# Patient Record
Sex: MALE | Race: WHITE | NOT HISPANIC OR LATINO | Employment: OTHER | URBAN - METROPOLITAN AREA
[De-identification: names, ages, dates, MRNs, and addresses within clinical notes are randomized per-mention and may not be internally consistent; named-entity substitution may affect disease eponyms.]

---

## 2018-05-01 ENCOUNTER — TRANSCRIBE ORDERS (OUTPATIENT)
Dept: ADMINISTRATIVE | Facility: HOSPITAL | Age: 83
End: 2018-05-01

## 2018-05-01 DIAGNOSIS — M40.204 KYPHOSIS OF THORACIC REGION, UNSPECIFIED KYPHOSIS TYPE: ICD-10-CM

## 2018-05-01 DIAGNOSIS — M81.0 SENILE OSTEOPOROSIS: Primary | ICD-10-CM

## 2018-05-07 ENCOUNTER — HOSPITAL ENCOUNTER (OUTPATIENT)
Dept: MAMMOGRAPHY | Facility: CLINIC | Age: 83
Discharge: HOME | End: 2018-05-07
Payer: COMMERCIAL

## 2018-05-07 DIAGNOSIS — M40.204 KYPHOSIS OF THORACIC REGION, UNSPECIFIED KYPHOSIS TYPE: ICD-10-CM

## 2018-05-07 PROCEDURE — 77080 DXA BONE DENSITY AXIAL: CPT

## 2018-09-28 ENCOUNTER — HOSPITAL ENCOUNTER (OUTPATIENT)
Dept: RADIOLOGY | Facility: HOSPITAL | Age: 83
Discharge: HOME | End: 2018-09-28
Payer: COMMERCIAL

## 2018-09-28 ENCOUNTER — TRANSCRIBE ORDERS (OUTPATIENT)
Dept: ADMINISTRATIVE | Facility: HOSPITAL | Age: 83
End: 2018-09-28

## 2018-09-28 DIAGNOSIS — R97.20 ELEVATED PROSTATE SPECIFIC ANTIGEN (PSA): ICD-10-CM

## 2018-09-28 DIAGNOSIS — R97.20 ELEVATED PROSTATE SPECIFIC ANTIGEN (PSA): Primary | ICD-10-CM

## 2018-09-28 PROCEDURE — 72100 X-RAY EXAM L-S SPINE 2/3 VWS: CPT

## 2018-09-28 PROCEDURE — 72170 X-RAY EXAM OF PELVIS: CPT

## 2018-11-30 ENCOUNTER — TRANSCRIBE ORDERS (OUTPATIENT)
Dept: ADMINISTRATIVE | Facility: HOSPITAL | Age: 83
End: 2018-11-30

## 2018-11-30 DIAGNOSIS — R06.02 SHORTNESS OF BREATH: Primary | ICD-10-CM

## 2018-11-30 DIAGNOSIS — I27.20 PROGRESSIVE PULMONARY HYPERTENSION (HCC): ICD-10-CM

## 2019-01-02 ENCOUNTER — APPOINTMENT (EMERGENCY)
Dept: RADIOLOGY | Facility: HOSPITAL | Age: 84
DRG: 554 | End: 2019-01-02
Payer: COMMERCIAL

## 2019-01-02 ENCOUNTER — HOSPITAL ENCOUNTER (INPATIENT)
Facility: HOSPITAL | Age: 84
LOS: 5 days | Discharge: NURSING, REHAB OR LONG-TERM CARE | DRG: 554 | End: 2019-01-07
Attending: EMERGENCY MEDICINE | Admitting: INTERNAL MEDICINE
Payer: COMMERCIAL

## 2019-01-02 DIAGNOSIS — L03.115 CELLULITIS OF RIGHT FOOT: Primary | ICD-10-CM

## 2019-01-02 DIAGNOSIS — E87.6 HYPOKALEMIA: ICD-10-CM

## 2019-01-02 DIAGNOSIS — M10.9 ACUTE GOUTY ARTHRITIS: ICD-10-CM

## 2019-01-02 PROBLEM — D72.829 LEUKOCYTOSIS: Status: ACTIVE | Noted: 2019-01-02

## 2019-01-02 PROBLEM — I35.0 NONRHEUMATIC AORTIC VALVE STENOSIS: Status: ACTIVE | Noted: 2019-01-02

## 2019-01-02 PROBLEM — L03.90 CELLULITIS: Status: ACTIVE | Noted: 2019-01-02

## 2019-01-02 PROBLEM — I48.91 ATRIAL FIBRILLATION (HCC): Status: ACTIVE | Noted: 2019-01-02

## 2019-01-02 PROBLEM — I48.20 CHRONIC ATRIAL FIBRILLATION (HCC): Status: ACTIVE | Noted: 2019-01-02

## 2019-01-02 PROBLEM — F03.90 SENILE DEMENTIA WITHOUT BEHAVIORAL DISTURBANCE (HCC): Status: ACTIVE | Noted: 2019-01-02

## 2019-01-02 PROBLEM — R01.1 SYSTOLIC MURMUR: Status: ACTIVE | Noted: 2019-01-02

## 2019-01-02 PROBLEM — N17.9 AKI (ACUTE KIDNEY INJURY) (HCC): Status: ACTIVE | Noted: 2019-01-02

## 2019-01-02 LAB
ALBUMIN SERPL BCP-MCNC: 3 G/DL (ref 3.5–5)
ALP SERPL-CCNC: 71 U/L (ref 46–116)
ALT SERPL W P-5'-P-CCNC: 24 U/L (ref 12–78)
ANION GAP SERPL CALCULATED.3IONS-SCNC: 6 MMOL/L (ref 4–13)
APTT PPP: 35 SECONDS (ref 26–38)
AST SERPL W P-5'-P-CCNC: 48 U/L (ref 5–45)
ATRIAL RATE: 110 BPM
BACTERIA UR QL AUTO: ABNORMAL /HPF
BASOPHILS # BLD AUTO: 0.03 THOUSANDS/ΜL (ref 0–0.1)
BASOPHILS NFR BLD AUTO: 0 % (ref 0–1)
BILIRUB SERPL-MCNC: 0.8 MG/DL (ref 0.2–1)
BILIRUB UR QL STRIP: NEGATIVE
BUN SERPL-MCNC: 15 MG/DL (ref 5–25)
CALCIUM SERPL-MCNC: 9 MG/DL (ref 8.3–10.1)
CHLORIDE SERPL-SCNC: 93 MMOL/L (ref 100–108)
CLARITY UR: CLEAR
CO2 SERPL-SCNC: 37 MMOL/L (ref 21–32)
COARSE GRAN CASTS URNS QL MICRO: ABNORMAL /LPF
COLOR UR: ABNORMAL
CREAT SERPL-MCNC: 1.32 MG/DL (ref 0.6–1.3)
EOSINOPHIL # BLD AUTO: 0.01 THOUSAND/ΜL (ref 0–0.61)
EOSINOPHIL NFR BLD AUTO: 0 % (ref 0–6)
ERYTHROCYTE [DISTWIDTH] IN BLOOD BY AUTOMATED COUNT: 12.4 % (ref 11.6–15.1)
ERYTHROCYTE [SEDIMENTATION RATE] IN BLOOD: 54 MM/HOUR (ref 0–10)
GFR SERPL CREATININE-BSD FRML MDRD: 49 ML/MIN/1.73SQ M
GLUCOSE SERPL-MCNC: 139 MG/DL (ref 65–140)
GLUCOSE UR STRIP-MCNC: NEGATIVE MG/DL
HCT VFR BLD AUTO: 33.6 % (ref 36.5–49.3)
HGB BLD-MCNC: 11.3 G/DL (ref 12–17)
HGB UR QL STRIP.AUTO: ABNORMAL
HYALINE CASTS #/AREA URNS LPF: ABNORMAL /LPF
IMM GRANULOCYTES # BLD AUTO: 0.07 THOUSAND/UL (ref 0–0.2)
IMM GRANULOCYTES NFR BLD AUTO: 1 % (ref 0–2)
INR PPP: 1.28 (ref 0.86–1.17)
KETONES UR STRIP-MCNC: ABNORMAL MG/DL
LACTATE SERPL-SCNC: 1.7 MMOL/L (ref 0.5–2)
LEUKOCYTE ESTERASE UR QL STRIP: NEGATIVE
LYMPHOCYTES # BLD AUTO: 0.39 THOUSANDS/ΜL (ref 0.6–4.47)
LYMPHOCYTES NFR BLD AUTO: 4 % (ref 14–44)
MCH RBC QN AUTO: 30.5 PG (ref 26.8–34.3)
MCHC RBC AUTO-ENTMCNC: 33.6 G/DL (ref 31.4–37.4)
MCV RBC AUTO: 91 FL (ref 82–98)
MONOCYTES # BLD AUTO: 0.92 THOUSAND/ΜL (ref 0.17–1.22)
MONOCYTES NFR BLD AUTO: 8 % (ref 4–12)
MUCOUS THREADS UR QL AUTO: ABNORMAL
NEUTROPHILS # BLD AUTO: 9.66 THOUSANDS/ΜL (ref 1.85–7.62)
NEUTS SEG NFR BLD AUTO: 87 % (ref 43–75)
NITRITE UR QL STRIP: NEGATIVE
NON-SQ EPI CELLS URNS QL MICRO: ABNORMAL /HPF
NRBC BLD AUTO-RTO: 0 /100 WBCS
PH UR STRIP.AUTO: 5.5 [PH] (ref 4.5–8)
PLATELET # BLD AUTO: 212 THOUSANDS/UL (ref 149–390)
PLATELET # BLD AUTO: 226 THOUSANDS/UL (ref 149–390)
PMV BLD AUTO: 10.8 FL (ref 8.9–12.7)
PMV BLD AUTO: 9 FL (ref 8.9–12.7)
POTASSIUM SERPL-SCNC: 3.2 MMOL/L (ref 3.5–5.3)
PROT SERPL-MCNC: 8 G/DL (ref 6.4–8.2)
PROT UR STRIP-MCNC: ABNORMAL MG/DL
PROTHROMBIN TIME: 15.3 SECONDS (ref 11.8–14.2)
QRS AXIS: -13 DEGREES
QRSD INTERVAL: 122 MS
QT INTERVAL: 330 MS
QTC INTERVAL: 414 MS
RBC # BLD AUTO: 3.71 MILLION/UL (ref 3.88–5.62)
RBC #/AREA URNS AUTO: ABNORMAL /HPF
SODIUM SERPL-SCNC: 136 MMOL/L (ref 136–145)
SP GR UR STRIP.AUTO: 1.02 (ref 1–1.03)
T WAVE AXIS: 86 DEGREES
URATE SERPL-MCNC: 6.8 MG/DL (ref 4.2–8)
UROBILINOGEN UR QL STRIP.AUTO: 0.2 E.U./DL
VENTRICULAR RATE: 95 BPM
WBC # BLD AUTO: 11.08 THOUSAND/UL (ref 4.31–10.16)
WBC #/AREA URNS AUTO: ABNORMAL /HPF

## 2019-01-02 PROCEDURE — 93010 ELECTROCARDIOGRAM REPORT: CPT | Performed by: INTERNAL MEDICINE

## 2019-01-02 PROCEDURE — 85610 PROTHROMBIN TIME: CPT | Performed by: EMERGENCY MEDICINE

## 2019-01-02 PROCEDURE — 83605 ASSAY OF LACTIC ACID: CPT | Performed by: EMERGENCY MEDICINE

## 2019-01-02 PROCEDURE — 96365 THER/PROPH/DIAG IV INF INIT: CPT

## 2019-01-02 PROCEDURE — 85652 RBC SED RATE AUTOMATED: CPT | Performed by: EMERGENCY MEDICINE

## 2019-01-02 PROCEDURE — 85025 COMPLETE CBC W/AUTO DIFF WBC: CPT | Performed by: EMERGENCY MEDICINE

## 2019-01-02 PROCEDURE — 87040 BLOOD CULTURE FOR BACTERIA: CPT | Performed by: EMERGENCY MEDICINE

## 2019-01-02 PROCEDURE — 84550 ASSAY OF BLOOD/URIC ACID: CPT | Performed by: EMERGENCY MEDICINE

## 2019-01-02 PROCEDURE — 71046 X-RAY EXAM CHEST 2 VIEWS: CPT

## 2019-01-02 PROCEDURE — 85049 AUTOMATED PLATELET COUNT: CPT | Performed by: NURSE PRACTITIONER

## 2019-01-02 PROCEDURE — 80053 COMPREHEN METABOLIC PANEL: CPT | Performed by: EMERGENCY MEDICINE

## 2019-01-02 PROCEDURE — 85730 THROMBOPLASTIN TIME PARTIAL: CPT | Performed by: EMERGENCY MEDICINE

## 2019-01-02 PROCEDURE — 81001 URINALYSIS AUTO W/SCOPE: CPT | Performed by: EMERGENCY MEDICINE

## 2019-01-02 PROCEDURE — 93005 ELECTROCARDIOGRAM TRACING: CPT

## 2019-01-02 PROCEDURE — 36415 COLL VENOUS BLD VENIPUNCTURE: CPT | Performed by: EMERGENCY MEDICINE

## 2019-01-02 PROCEDURE — 99285 EMERGENCY DEPT VISIT HI MDM: CPT

## 2019-01-02 PROCEDURE — 96375 TX/PRO/DX INJ NEW DRUG ADDON: CPT

## 2019-01-02 PROCEDURE — 73620 X-RAY EXAM OF FOOT: CPT

## 2019-01-02 PROCEDURE — 99222 1ST HOSP IP/OBS MODERATE 55: CPT | Performed by: INTERNAL MEDICINE

## 2019-01-02 RX ORDER — MONTELUKAST SODIUM 10 MG/1
10 TABLET ORAL
Status: DISCONTINUED | OUTPATIENT
Start: 2019-01-02 | End: 2019-01-07 | Stop reason: HOSPADM

## 2019-01-02 RX ORDER — NITROGLYCERIN 0.4 MG/1
0.4 TABLET SUBLINGUAL
COMMUNITY

## 2019-01-02 RX ORDER — LEVOFLOXACIN 5 MG/ML
250 INJECTION, SOLUTION INTRAVENOUS EVERY 24 HOURS
Status: DISCONTINUED | OUTPATIENT
Start: 2019-01-02 | End: 2019-01-04

## 2019-01-02 RX ORDER — HEPARIN SODIUM 5000 [USP'U]/ML
5000 INJECTION, SOLUTION INTRAVENOUS; SUBCUTANEOUS EVERY 8 HOURS SCHEDULED
Status: DISCONTINUED | OUTPATIENT
Start: 2019-01-02 | End: 2019-01-07 | Stop reason: HOSPADM

## 2019-01-02 RX ORDER — COLCHICINE 0.6 MG/1
0.6 TABLET ORAL DAILY
Status: DISCONTINUED | OUTPATIENT
Start: 2019-01-03 | End: 2019-01-04

## 2019-01-02 RX ORDER — FUROSEMIDE 40 MG/1
40 TABLET ORAL DAILY
Status: DISCONTINUED | OUTPATIENT
Start: 2019-01-03 | End: 2019-01-07 | Stop reason: HOSPADM

## 2019-01-02 RX ORDER — METOPROLOL TARTRATE 50 MG/1
TABLET, FILM COATED ORAL DAILY
COMMUNITY

## 2019-01-02 RX ORDER — METOPROLOL TARTRATE 50 MG/1
50 TABLET, FILM COATED ORAL DAILY
Status: DISCONTINUED | OUTPATIENT
Start: 2019-01-03 | End: 2019-01-07 | Stop reason: HOSPADM

## 2019-01-02 RX ORDER — TRAMADOL HYDROCHLORIDE 50 MG/1
50 TABLET ORAL 4 TIMES DAILY
COMMUNITY
End: 2019-10-14

## 2019-01-02 RX ORDER — LANOLIN ALCOHOL/MO/W.PET/CERES
1000 CREAM (GRAM) TOPICAL DAILY
Status: ON HOLD | COMMUNITY
End: 2019-01-02 | Stop reason: SDDI

## 2019-01-02 RX ORDER — DIGOXIN 125 MCG
125 TABLET ORAL DAILY
Status: DISCONTINUED | OUTPATIENT
Start: 2019-01-03 | End: 2019-01-07 | Stop reason: HOSPADM

## 2019-01-02 RX ORDER — FAMOTIDINE 20 MG/1
40 TABLET, FILM COATED ORAL DAILY
Status: DISCONTINUED | OUTPATIENT
Start: 2019-01-03 | End: 2019-01-07 | Stop reason: HOSPADM

## 2019-01-02 RX ORDER — SODIUM CHLORIDE 9 MG/ML
125 INJECTION, SOLUTION INTRAVENOUS CONTINUOUS
Status: DISCONTINUED | OUTPATIENT
Start: 2019-01-02 | End: 2019-01-02

## 2019-01-02 RX ORDER — CLOPIDOGREL BISULFATE 75 MG/1
75 TABLET ORAL DAILY
COMMUNITY

## 2019-01-02 RX ORDER — DIGOXIN 125 MCG
TABLET ORAL DAILY
COMMUNITY

## 2019-01-02 RX ORDER — TRAMADOL HYDROCHLORIDE 50 MG/1
50 TABLET ORAL 4 TIMES DAILY
Status: DISCONTINUED | OUTPATIENT
Start: 2019-01-02 | End: 2019-01-05

## 2019-01-02 RX ORDER — CLOPIDOGREL BISULFATE 75 MG/1
75 TABLET ORAL DAILY
Status: DISCONTINUED | OUTPATIENT
Start: 2019-01-03 | End: 2019-01-07 | Stop reason: HOSPADM

## 2019-01-02 RX ORDER — POTASSIUM CHLORIDE 20 MEQ/1
40 TABLET, EXTENDED RELEASE ORAL ONCE
Status: COMPLETED | OUTPATIENT
Start: 2019-01-02 | End: 2019-01-02

## 2019-01-02 RX ORDER — FUROSEMIDE 40 MG/1
TABLET ORAL DAILY
COMMUNITY

## 2019-01-02 RX ORDER — FAMOTIDINE 40 MG/1
40 TABLET, FILM COATED ORAL DAILY
COMMUNITY

## 2019-01-02 RX ORDER — METHYLPREDNISOLONE SODIUM SUCCINATE 40 MG/ML
20 INJECTION, POWDER, LYOPHILIZED, FOR SOLUTION INTRAMUSCULAR; INTRAVENOUS 3 TIMES DAILY
Status: DISCONTINUED | OUTPATIENT
Start: 2019-01-02 | End: 2019-01-04

## 2019-01-02 RX ORDER — ATORVASTATIN CALCIUM 40 MG/1
40 TABLET, FILM COATED ORAL DAILY
COMMUNITY

## 2019-01-02 RX ORDER — ATORVASTATIN CALCIUM 40 MG/1
40 TABLET, FILM COATED ORAL DAILY
Status: DISCONTINUED | OUTPATIENT
Start: 2019-01-03 | End: 2019-01-07 | Stop reason: HOSPADM

## 2019-01-02 RX ORDER — ASPIRIN 325 MG
325 TABLET, DELAYED RELEASE (ENTERIC COATED) ORAL DAILY
Status: DISCONTINUED | OUTPATIENT
Start: 2019-01-03 | End: 2019-01-07 | Stop reason: HOSPADM

## 2019-01-02 RX ORDER — OMEPRAZOLE 10 MG/1
40 CAPSULE, DELAYED RELEASE ORAL DAILY
COMMUNITY
End: 2019-10-14

## 2019-01-02 RX ORDER — MONTELUKAST SODIUM 10 MG/1
10 TABLET ORAL
COMMUNITY

## 2019-01-02 RX ADMIN — LEVOFLOXACIN 250 MG: 5 INJECTION, SOLUTION INTRAVENOUS at 20:22

## 2019-01-02 RX ADMIN — TRAMADOL HYDROCHLORIDE 50 MG: 50 TABLET, COATED ORAL at 21:58

## 2019-01-02 RX ADMIN — MONTELUKAST SODIUM 10 MG: 10 TABLET, FILM COATED ORAL at 21:58

## 2019-01-02 RX ADMIN — METHYLPREDNISOLONE SODIUM SUCCINATE 20 MG: 40 INJECTION, POWDER, FOR SOLUTION INTRAMUSCULAR; INTRAVENOUS at 21:58

## 2019-01-02 RX ADMIN — SODIUM CHLORIDE 125 ML/HR: 0.9 INJECTION, SOLUTION INTRAVENOUS at 16:17

## 2019-01-02 RX ADMIN — VANCOMYCIN HYDROCHLORIDE 1250 MG: 1 INJECTION, POWDER, LYOPHILIZED, FOR SOLUTION INTRAVENOUS at 17:24

## 2019-01-02 RX ADMIN — POTASSIUM CHLORIDE 40 MEQ: 1500 TABLET, EXTENDED RELEASE ORAL at 17:40

## 2019-01-02 RX ADMIN — HEPARIN SODIUM 5000 UNITS: 5000 INJECTION INTRAVENOUS; SUBCUTANEOUS at 21:58

## 2019-01-02 RX ADMIN — CEFEPIME HYDROCHLORIDE 2000 MG: 2 INJECTION, SOLUTION INTRAVENOUS at 16:37

## 2019-01-02 NOTE — ASSESSMENT & PLAN NOTE
-  May possibly be CKD, baseline renal function unknown  -  Obtain records from [facility]  -  Monitor renal function daily  - Accurate I& O

## 2019-01-02 NOTE — H&P
H&P- Rad Niño 1934, 80 y o  male MRN: [id number]    Unit/Bed#: KOKO Encounter: [id number]    Primary Care Provider: Cheko Willis DO   Date and time admitted to hospital: 1/2/2019  3:42 PM    History and Physical - Critical Care   Rad Niño 80 y o  male MRN: [id number]  Unit/Bed#: KOKO Encounter: [id number]    Reason for Admission / Chief Complaint:  Right foot pain, edema, and redness  Patient unable to ambulate  History of Present Illness:  Rad Niño is a 80 y o  male with a significant past medical history for atrial fibrillation, aortic stenosis, CHF who presents to the emergency department with complaints of right foot pain, inability to ambulate, erythema, and edema to the right foot  The patient was on the toilet at home and was unable to get off he sat there for 2+ hours  EMS also noted very strong foul smelling urine  He was found to have a mild leukocytosis on admission with a white count of 11 08  The patient's temperature was 99 6° F,  he was mildly tachycardic with a heart rate of 110  Blood cultures, and x-ray of the foot were obtained and the patient was started on IV antibiotics for concern of cellulitis  Urinalysis pending  History obtained from chart review and patient  Past Medical History:  Past Medical History:   Diagnosis Date    Aortic stenosis     Atrial fibrillation (HCC)     Cardiac disease     CHF (congestive heart failure) (HCC)     Hyperlipidemia     Hypertension     Kyphosis     Osteoarthritis     Prostate CA ([address] )     Pulmonary hypertension (HCC)     Renal disorder     Vitamin B12 deficiency        Past Surgical History:  Past Surgical History:   Procedure Laterality Date    JOINT REPLACEMENT      PILONIDAL CYST / SINUS EXCISION      VARICOSE VEIN SURGERY         Past Family History:  History reviewed  No pertinent family history      Social History:  History   Smoking Status    Former Smoker   Smokeless Tobacco    Never Used History   Alcohol Use No     History   Drug Use No     Marital Status: /Civil Union      Medications:  Current Facility-Administered Medications   Medication Dose Route Frequency    sodium chloride 0 9 % infusion  125 mL/hr Intravenous Continuous    vancomycin (VANCOCIN) 1,250 mg in sodium chloride 0 9 % 250 mL IVPB  15 mg/kg Intravenous Once     Home medications:  Prior to Admission medications    Medication Sig Start Date End Date Taking? Authorizing Provider   aspirin (ECOTRIN) 325 mg EC tablet Take 325 mg by mouth daily   Yes Historical Provider, MD   atorvastatin (LIPITOR) 40 mg tablet Take 40 mg by mouth daily   Yes Historical Provider, MD   cyanocobalamin (VITAMIN B-12) 1,000 mcg tablet Take 1,000 mcg by mouth daily   Yes Historical Provider, MD   Ergocalciferol (VITAMIN D2 PO) Take 50,000 Units by mouth Every 3rd week   Yes Historical Provider, MD   famotidine (PEPCID) 40 MG tablet Take 40 mg by mouth daily   Yes Historical Provider, MD   montelukast (SINGULAIR) 10 mg tablet Take 10 mg by mouth daily at bedtime   Yes Historical Provider, MD   traMADol (ULTRAM) 50 mg tablet Take 50 mg by mouth 4 (four) times a day   Yes Historical Provider, MD   clopidogrel (PLAVIX) 75 mg tablet Take by mouth    Historical Provider, MD   digoxin (DIGITEK) 0 125 mg tablet Take by mouth    Historical Provider, MD   Ferrous Fumarate 325 (106 Fe) MG TABS Take by mouth    Historical Provider, MD   furosemide (LASIX) 40 mg tablet Take by mouth    Historical Provider, MD   metoprolol tartrate (LOPRESSOR) 50 mg tablet Take by mouth    Historical Provider, MD   nitroglycerin (NITROSTAT) 0 4 mg SL tablet Place under the tongue    Historical Provider, MD   omeprazole (PriLOSEC) 10 mg delayed release capsule Take 40 mg by mouth daily      Historical Provider, MD     Allergies: Allergies   Allergen Reactions    Penicillins        ROS:   Review of Systems   Constitutional: Positive for chills and fatigue  HENT: Negative  Eyes: Negative  Respiratory: Negative  Cardiovascular: Negative  Gastrointestinal: Negative  Endocrine: Negative  Genitourinary: Positive for urgency  Musculoskeletal: Positive for joint swelling  Right foot edema, pain, redness   Neurological: Negative  Psychiatric/Behavioral: Negative  Vitals:  Vitals:    19 1549   BP: 132/80   BP Location: Right arm   Pulse: (!) 109   Resp: 18   Temp: 99 6 °F (37 6 °C)   TempSrc: Temporal   SpO2: 97%   Weight: 87 1 kg (192 lb)   Height: 5' 10" (1 778 m)     Temperature:   Temp (24hrs), Av 6 °F (37 6 °C), Min:99 6 °F (37 6 °C), Max:99 6 °F (37 6 °C)    Current Temperature: 99 6 °F (37 6 °C)    Weights:   IBW: 73 kg  Body mass index is 27 55 kg/m²  Hemodynamic Monitoring:  N/A     Non-Invasive/Invasive Ventilation Settings:  Respiratory    Lab Data (Last 4 hours)    None         O2/Vent Data (Last 4 hours)    None                 Physical Exam:  Physical Exam   Constitutional: He is oriented to person, place, and time  He is cooperative  HENT:   Head: Normocephalic and atraumatic  Eyes: EOM are normal    Neck: Neck supple  Cardiovascular: Normal rate  An irregular rhythm present  Exam reveals decreased pulses  Murmur heard  Systolic murmur is present with a grade of 4/6   Pulmonary/Chest: Effort normal  He has decreased breath sounds  Abdominal: Soft  Normal appearance and bowel sounds are normal  There is no tenderness  Musculoskeletal:   Right anterior foot erythema, edema, and warmth up to midshin   Neurological: He is alert and oriented to person, place, and time  forgetful   Skin: Skin is warm and dry  Psychiatric: He has a normal mood and affect         Labs:    Results from last 7 days  Lab Units 19  1609   WBC Thousand/uL 11 08*   HEMOGLOBIN g/dL 11 3*   HEMATOCRIT % 33 6*   PLATELETS Thousands/uL 226   NEUTROS PCT % 87*   MONOS PCT % 8       Results from last 7 days  Lab Units 19  1609   SODIUM mmol/L 136   POTASSIUM mmol/L 3 2*   CHLORIDE mmol/L 93*   CO2 mmol/L 37*   ANION GAP mmol/L 6   BUN mg/dL 15   CREATININE mg/dL 1 32*   CALCIUM mg/dL 9 0   ALT U/L 24   AST U/L 48*   ALK PHOS U/L 71   ALBUMIN g/dL 3 0*   TOTAL BILIRUBIN mg/dL 0 80            Results from last 7 days  Lab Units 01/02/19  1609   INR  1 28*   PTT seconds 35           Results from last 7 days  Lab Units 01/02/19  1609   LACTIC ACID mmol/L 1 7     ABG:No results found for: PHART, IWO3OHZ, PO2ART, KHR2GGF, E1QQYTGZ, BEART, SOURCE  VBG:            Imaging:  I have personally reviewed pertinent reports  Leukocytosis   Assessment & Plan    -  continue to monitor white count and fever curve  -  suspect related to cellulitis  -  Urinalysis, blood cultures pending     Hypokalemia   Assessment & Plan    -  potassium repleted in ED  -  daily BMP  -  goal potassium greater than 4     Systolic murmur   Assessment & Plan    -  Obtain records from [facility]  -       Atrial fibrillation [facility])   Assessment & Plan    -  Continue home digoxin, Metoprolol  -  Continue ASA/plavix  -  not currently on anticoagulation per home medication list  -  Telemetry x 24 hours     MANDI (acute kidney injury) [facility])   Assessment & Plan    -  May possibly be CKD, baseline renal function unknown  -  Obtain records from [facility]  -  Monitor renal function daily  - Accurate I& O     * Cellulitis   Assessment & Plan    -  vancomycin x1 dose given in the ED  -  Levaquin initiated, patient had angioedema with cephalosporins  -  blood cultures pending           ______________________________________________________________________    Counseling / Coordination of Care  Total time spent today 45 minutes  Greater than 50% of total time was spent with the patient and / or family counseling and / or coordination of care   A description of the counseling / coordination of care: ______________________________________________________________________    VTE Pharmacologic Prophylaxis: Heparin  VTE Mechanical Prophylaxis: sequential compression device    Invasive lines and devices: Invasive Devices     Peripheral Intravenous Line            Peripheral IV 01/02/19 Left Arm less than 1 day                Code Status: No Order  POA:    POLST:      Given critical illness, patient length of stay will require greater than two midnights  Portions of the record may have been created with voice recognition software  Occasional wrong word or "sound a like" substitutions may have occurred due to the inherent limitations of voice recognition software  Read the chart carefully and recognize, using context, where substitutions have occurred        April D Saintclair Pock

## 2019-01-02 NOTE — ASSESSMENT & PLAN NOTE
-  continue to monitor white count and fever curve  -  suspect related to cellulitis  -  Urinalysis, blood cultures pending

## 2019-01-02 NOTE — ED NOTES
Straight catheter urine specimen attempted  Unable to pass catheter successfully into bladder x 2 attempts  MD catherine Cole, DO  01/02/19 9591

## 2019-01-02 NOTE — ASSESSMENT & PLAN NOTE
-  vancomycin x1 dose given in the ED  -  Levaquin initiated, patient had angioedema with cephalosporins  -  blood cultures pending

## 2019-01-02 NOTE — ASSESSMENT & PLAN NOTE
-  Continue home digoxin, Metoprolol  -  Continue ASA/plavix  -  not currently on anticoagulation per home medication list  -  Telemetry x 24 hours

## 2019-01-02 NOTE — ED PROVIDER NOTES
History  Chief Complaint   Patient presents with    Cellulitis     Patient presents to the ER via ambulance with report that ems got called to the house for an assist off the toilet because of pain in right foot, report that the patient sat on toilet since 1230  strong urine odor in depends   Possible UTI     This is an 80-year-old male who presents from home via ambulance for evaluation of generalized weakness unable to get off the toilet and ambulate  He has a swollen painful erythematous right foot that has been increasing over the past week denies any fevers or chills he also has decreased p o  Intake and foul-smelling urine  History provided by:  Patient and EMS personnel  Medical Problem   Location:  Right foot  Quality:  Swelling erythema and pain with generalized weakness  Severity:  Severe  Onset quality:  Gradual  Timing:  Constant  Progression:  Worsening  Chronicity:  New  Context:  No known trauma or injury  Relieved by:  Nothing  Worsened by:  Ambulation  Associated symptoms: rash    Associated symptoms: no abdominal pain        Prior to Admission Medications   Prescriptions Last Dose Informant Patient Reported? Taking?    Ergocalciferol (VITAMIN D2 PO)   Yes Yes   Sig: Take 50,000 Units by mouth Every 3rd week   Ferrous Fumarate 325 (106 Fe) MG TABS   Yes No   Sig: Take by mouth   aspirin (ECOTRIN) 325 mg EC tablet   Yes Yes   Sig: Take 325 mg by mouth daily   atorvastatin (LIPITOR) 40 mg tablet   Yes Yes   Sig: Take 40 mg by mouth daily   clopidogrel (PLAVIX) 75 mg tablet   Yes No   Sig: Take by mouth   cyanocobalamin (VITAMIN B-12) 1,000 mcg tablet   Yes Yes   Sig: Take 1,000 mcg by mouth daily   digoxin (DIGITEK) 0 125 mg tablet   Yes No   Sig: Take by mouth   famotidine (PEPCID) 40 MG tablet   Yes Yes   Sig: Take 40 mg by mouth daily   furosemide (LASIX) 40 mg tablet   Yes No   Sig: Take by mouth   metoprolol tartrate (LOPRESSOR) 50 mg tablet   Yes No   Sig: Take by mouth   montelukast (SINGULAIR) 10 mg tablet   Yes Yes   Sig: Take 10 mg by mouth daily at bedtime   nitroglycerin (NITROSTAT) 0 4 mg SL tablet   Yes No   Sig: Place under the tongue   omeprazole (PriLOSEC) 10 mg delayed release capsule   Yes No   Sig: Take 40 mg by mouth daily     traMADol (ULTRAM) 50 mg tablet   Yes Yes   Sig: Take 50 mg by mouth 4 (four) times a day      Facility-Administered Medications: None       Past Medical History:   Diagnosis Date    Aortic stenosis     Atrial fibrillation (HCC)     Cardiac disease     CHF (congestive heart failure) (HCC)     Hyperlipidemia     Hypertension     Kyphosis     Osteoarthritis     [facility]     [facility]monary hypertension ([facility] )     Renal disorder     Vitamin B12 deficiency        Past Surgical History:   Procedure Laterality Date    JOINT REPLACEMENT      PILONIDAL CYST / SINUS EXCISION      VARICOSE VEIN SURGERY         History reviewed  No pertinent family history  I have reviewed and agree with the history as documented  Social History   Substance Use Topics    Smoking status: Former Smoker    Smokeless tobacco: Never Used    Alcohol use No        Review of Systems   Gastrointestinal: Negative for abdominal pain  Musculoskeletal: Positive for arthralgias  Right foot erythema warmth and swelling   Skin: Positive for rash  Neurological: Positive for weakness  All other systems reviewed and are negative  Physical Exam  Physical Exam   Constitutional: He is oriented to person, place, and time  He appears well-developed  No distress  HENT:   Head: Normocephalic and atraumatic  Right Ear: External ear normal    Left Ear: External ear normal    Nose: Nose normal    Mucous membranes dry with black tongue   Eyes: Pupils are equal, round, and reactive to light  EOM are normal  Right eye exhibits no discharge  Left eye exhibits no discharge  No scleral icterus  Neck: Neck supple  No tracheal deviation present     Cardiovascular: Exam reveals no gallop and no friction rub  Murmur heard  Tachycardic and irregular   Pulmonary/Chest: Effort normal and breath sounds normal  No stridor  No respiratory distress  He has no wheezes  He has no rales  Abdominal: Soft  Bowel sounds are normal  He exhibits no distension  There is no tenderness  There is no rebound and no guarding  Musculoskeletal: He exhibits edema and tenderness  He exhibits no deformity  Decreased range of motion to the right foot secondary to pain   Neurological: He is alert and oriented to person, place, and time  No cranial nerve deficit  Skin: Rash noted  He is not diaphoretic  There is erythema  Right foot erythema and warmth   Psychiatric: He has a normal mood and affect  His behavior is normal  Thought content normal    Nursing note and vitals reviewed        Vital Signs  ED Triage Vitals [01/02/19 1549]   Temperature Pulse Respirations Blood Pressure SpO2   99 6 °F (37 6 °C) (!) 109 18 132/80 97 %      Temp Source Heart Rate Source Patient Position - Orthostatic VS BP Location FiO2 (%)   Temporal Monitor Lying Right arm --      Pain Score       --           Vitals:    01/02/19 1549   BP: 132/80   Pulse: (!) 109   Patient Position - Orthostatic VS: Lying       Visual Acuity      ED Medications  Medications   sodium chloride 0 9 % infusion (125 mL/hr Intravenous New Bag 1/2/19 1617)   vancomycin (VANCOCIN) 1,250 mg in sodium chloride 0 9 % 250 mL IVPB (1,250 mg Intravenous New Bag 1/2/19 1724)   potassium chloride (K-DUR,KLOR-CON) CR tablet 40 mEq (not administered)   cefepime (MAXIPIME) 2 g/50 mL dextrose IVPB (0 mg Intravenous Stopped 1/2/19 1723)       Diagnostic Studies  Results Reviewed     Procedure Component Value Units Date/Time    Sedimentation rate, automated [[id number]]  (Abnormal) Collected:  01/02/19 1609    Lab Status:  Final result Specimen:  Blood from Arm, Right Updated:  01/02/19 1731     Sed Rate 54 (H) mm/hour     Uric acid [[id number]]  (Normal) Collected: 01/02/19 1609    Lab Status:  Final result Specimen:  Blood from Arm, Right Updated:  01/02/19 1716     Uric Acid 6 8 mg/dL     Comprehensive metabolic panel [[id number]]  (Abnormal) Collected:  01/02/19 1609    Lab Status:  Final result Specimen:  Blood from Arm, Right Updated:  01/02/19 1706     Sodium 136 mmol/L      Potassium 3 2 (L) mmol/L      Chloride 93 (L) mmol/L      CO2 37 (H) mmol/L      ANION GAP 6 mmol/L      BUN 15 mg/dL      Creatinine 1 32 (H) mg/dL      Glucose 139 mg/dL      Calcium 9 0 mg/dL      AST 48 (H) U/L      ALT 24 U/L      Alkaline Phosphatase 71 U/L      Total Protein 8 0 g/dL      Albumin 3 0 (L) g/dL      Total Bilirubin 0 80 mg/dL      eGFR 49 ml/min/1 73sq m     Narrative:         National Kidney Disease Education Program recommendations are as follows:  GFR calculation is accurate only with a steady state creatinine  Chronic Kidney disease less than 60 ml/min/1 73 sq  meters  Kidney failure less than 15 ml/min/1 73 sq  meters  Lactic acid, plasma [[id number]]  (Normal) Collected:  01/02/19 1609    Lab Status:  Final result Specimen:  Blood from Arm, Right Updated:  01/02/19 1659     LACTIC ACID 1 7 mmol/L     Narrative:         Result may be elevated if tourniquet was used during collection      Protime-INR [[id number]]  (Abnormal) Collected:  01/02/19 1609    Lab Status:  Final result Specimen:  Blood from Arm, Right Updated:  01/02/19 1640     Protime 15 3 (H) seconds      INR 1 28 (H)    APTT [[id number]]  (Normal) Collected:  01/02/19 1609    Lab Status:  Final result Specimen:  Blood from Arm, Right Updated:  01/02/19 1640     PTT 35 seconds     CBC and differential [[id number]]  (Abnormal) Collected:  01/02/19 1609    Lab Status:  Final result Specimen:  Blood from Arm, Right Updated:  01/02/19 1627     WBC 11 08 (H) Thousand/uL      RBC 3 71 (L) Million/uL      Hemoglobin 11 3 (L) g/dL      Hematocrit 33 6 (L) %      MCV 91 fL      MCH 30 5 pg      MCHC 33 6 g/dL      RDW 12 4 %      MPV 10 8 fL      Platelets 604 Thousands/uL      nRBC 0 /100 WBCs      Neutrophils Relative 87 (H) %      Immat GRANS % 1 %      Lymphocytes Relative 4 (L) %      Monocytes Relative 8 %      Eosinophils Relative 0 %      Basophils Relative 0 %      Neutrophils Absolute 9 66 (H) Thousands/µL      Immature Grans Absolute 0 07 Thousand/uL      Lymphocytes Absolute 0 39 (L) Thousands/µL      Monocytes Absolute 0 92 Thousand/µL      Eosinophils Absolute 0 01 Thousand/µL      Basophils Absolute 0 03 Thousands/µL     Blood culture #1 [[id number]] Collected:  01/02/19 1609    Lab Status: In process Specimen:  Blood from Arm, Right Updated:  01/02/19 1625    Blood culture #2 [[id number]] Collected:  01/02/19 1611    Lab Status: In process Specimen:  Blood from Arm, Left Updated:  01/02/19 1623    UA w Reflex to Microscopic w Reflex to Culture [[id number]]     Lab Status:  No result Specimen:  Urine                  XR chest 2 views   Final Result by Luis Daniel Bliss MD (01/02 1644)      No acute cardiopulmonary disease  Workstation performed: [id number]         XR foot 2 vw right   Final Result by Luis Daniel Bliss MD (01/02 1643)      No acute osseous abnormality  Moderate soft tissue swelling at the dorsum of the foot  Degenerative arthropathy in the midfoot and 1st MTP  Workstation performed: [id number]                    Procedures  ECG 12 Lead Documentation  Date/Time: 1/2/2019 4:07 PM  Performed by: Ramona Brody  Authorized by: Ramona Brody     ECG reviewed by me, the ED Provider: yes    Patient location:  ED  Rate:     ECG rate:  95  Rhythm:     Rhythm: atrial fibrillation    Ectopy:     Ectopy: none    T waves:     T waves: non-specific             Phone Contacts  ED Phone Contact    ED Course                         Initial Sepsis Screening     [address] Name 01/02/19 5033                Is the patient's history suggestive of a new or worsening infection?  (!)  Yes (Proceed)  -MELLISSA Suspected source of infection soft tissue  -MELLISSA        Are two or more of the following signs & symptoms of infection both present and new to the patient? No  -MELLISSA        Indicate SIRS criteria Tachycardia > 90 bpm  -MELLISSA        If the answer is yes to both questions, suspicion of sepsis is present          If severe sepsis is present AND tissue hypoperfusion perists in the hour after fluid resuscitation or lactate > 4, the patient meets criteria for SEPTIC SHOCK          Are any of the following organ dysfunction criteria present within 6 hours of suspected infection and SIRS criteria that are NOT considered to be chronic conditions?         Organ dysfunction          Date of presentation of severe sepsis          Time of presentation of severe sepsis          Tissue hypoperfusion persists in the hour after crystalloid fluid administration, evidenced, by either:          Was hypotension present within one hour of the conclusion of crystalloid fluid administration?         Date of presentation of septic shock          Time of presentation of septic shock            User Key  (r) = Recorded By, (t) = Taken By, (c) = Cosigned By    [address] Name Provider Type    [address], DO Physician                  MDM  Number of Diagnoses or Management Options  Diagnosis management comments: Right foot redness and swelling differential includes gouty arthritis versus infection    Generalized weakness may be dehydration or sepsis       Amount and/or Complexity of Data Reviewed  Clinical lab tests: ordered  Tests in the radiology section of CPT®: ordered      CritCare Time    Disposition  Final diagnoses:   Cellulitis of right foot   Hypokalemia     Time reflects when diagnosis was documented in both MDM as applicable and the Disposition within this note     Time User Action Codes Description Comment    1/2/2019  5:31 PM Claire Lindo [M18 [id number]] Cellulitis of right foot     1/2/2019  5:31 PM Claire Lindo [E87 [id number]] Hypokalemia       ED Disposition     ED Disposition Condition Comment    Admit  Case was discussed with **Dr Noé Jeffers* and the patient's admission status was agreed to be Admission Status: inpatient status to the service of Dr Noé Jeffers   Follow-up Information    None         Patient's Medications   Discharge Prescriptions    No medications on file     No discharge procedures on file      ED Provider  Electronically Signed by           Helena Harrell DO  01/02/19 173

## 2019-01-03 ENCOUNTER — APPOINTMENT (INPATIENT)
Dept: NON INVASIVE DIAGNOSTICS | Facility: HOSPITAL | Age: 84
DRG: 554 | End: 2019-01-03
Payer: COMMERCIAL

## 2019-01-03 PROBLEM — I35.0 NONRHEUMATIC AORTIC VALVE STENOSIS: Status: RESOLVED | Noted: 2019-01-02 | Resolved: 2019-01-03

## 2019-01-03 LAB
ANION GAP SERPL CALCULATED.3IONS-SCNC: 6 MMOL/L (ref 4–13)
BASOPHILS # BLD AUTO: 0.01 THOUSANDS/ΜL (ref 0–0.1)
BASOPHILS NFR BLD AUTO: 0 % (ref 0–1)
BUN SERPL-MCNC: 14 MG/DL (ref 5–25)
CALCIUM SERPL-MCNC: 8.6 MG/DL (ref 8.3–10.1)
CHLORIDE SERPL-SCNC: 97 MMOL/L (ref 100–108)
CO2 SERPL-SCNC: 34 MMOL/L (ref 21–32)
CREAT SERPL-MCNC: 1.22 MG/DL (ref 0.6–1.3)
EOSINOPHIL # BLD AUTO: 0.01 THOUSAND/ΜL (ref 0–0.61)
EOSINOPHIL NFR BLD AUTO: 0 % (ref 0–6)
ERYTHROCYTE [DISTWIDTH] IN BLOOD BY AUTOMATED COUNT: 12.7 % (ref 11.6–15.1)
GFR SERPL CREATININE-BSD FRML MDRD: 54 ML/MIN/1.73SQ M
GLUCOSE SERPL-MCNC: 151 MG/DL (ref 65–140)
HCT VFR BLD AUTO: 31 % (ref 36.5–49.3)
HGB BLD-MCNC: 10.2 G/DL (ref 12–17)
IMM GRANULOCYTES # BLD AUTO: 0.03 THOUSAND/UL (ref 0–0.2)
IMM GRANULOCYTES NFR BLD AUTO: 0 % (ref 0–2)
LYMPHOCYTES # BLD AUTO: 0.4 THOUSANDS/ΜL (ref 0.6–4.47)
LYMPHOCYTES NFR BLD AUTO: 5 % (ref 14–44)
MAGNESIUM SERPL-MCNC: 2.1 MG/DL (ref 1.6–2.6)
MCH RBC QN AUTO: 30.4 PG (ref 26.8–34.3)
MCHC RBC AUTO-ENTMCNC: 32.9 G/DL (ref 31.4–37.4)
MCV RBC AUTO: 92 FL (ref 82–98)
MONOCYTES # BLD AUTO: 0.59 THOUSAND/ΜL (ref 0.17–1.22)
MONOCYTES NFR BLD AUTO: 7 % (ref 4–12)
NEUTROPHILS # BLD AUTO: 7.62 THOUSANDS/ΜL (ref 1.85–7.62)
NEUTS SEG NFR BLD AUTO: 88 % (ref 43–75)
PHOSPHATE SERPL-MCNC: 3.2 MG/DL (ref 2.3–4.1)
PLATELET # BLD AUTO: 237 THOUSANDS/UL (ref 149–390)
PMV BLD AUTO: 8.8 FL (ref 8.9–12.7)
POTASSIUM SERPL-SCNC: 3.7 MMOL/L (ref 3.5–5.3)
RBC # BLD AUTO: 3.36 MILLION/UL (ref 3.88–5.62)
SODIUM SERPL-SCNC: 137 MMOL/L (ref 136–145)
WBC # BLD AUTO: 8.66 THOUSAND/UL (ref 4.31–10.16)

## 2019-01-03 PROCEDURE — G8978 MOBILITY CURRENT STATUS: HCPCS

## 2019-01-03 PROCEDURE — 97163 PT EVAL HIGH COMPLEX 45 MIN: CPT

## 2019-01-03 PROCEDURE — 80048 BASIC METABOLIC PNL TOTAL CA: CPT | Performed by: NURSE PRACTITIONER

## 2019-01-03 PROCEDURE — 85025 COMPLETE CBC W/AUTO DIFF WBC: CPT | Performed by: NURSE PRACTITIONER

## 2019-01-03 PROCEDURE — 97167 OT EVAL HIGH COMPLEX 60 MIN: CPT

## 2019-01-03 PROCEDURE — 97530 THERAPEUTIC ACTIVITIES: CPT

## 2019-01-03 PROCEDURE — 83735 ASSAY OF MAGNESIUM: CPT | Performed by: NURSE PRACTITIONER

## 2019-01-03 PROCEDURE — 99232 SBSQ HOSP IP/OBS MODERATE 35: CPT | Performed by: NURSE PRACTITIONER

## 2019-01-03 PROCEDURE — G8979 MOBILITY GOAL STATUS: HCPCS

## 2019-01-03 PROCEDURE — 84100 ASSAY OF PHOSPHORUS: CPT | Performed by: NURSE PRACTITIONER

## 2019-01-03 PROCEDURE — G8987 SELF CARE CURRENT STATUS: HCPCS

## 2019-01-03 PROCEDURE — 93306 TTE W/DOPPLER COMPLETE: CPT | Performed by: INTERNAL MEDICINE

## 2019-01-03 PROCEDURE — 93306 TTE W/DOPPLER COMPLETE: CPT

## 2019-01-03 PROCEDURE — G8988 SELF CARE GOAL STATUS: HCPCS

## 2019-01-03 RX ADMIN — ATORVASTATIN CALCIUM 40 MG: 40 TABLET, FILM COATED ORAL at 09:04

## 2019-01-03 RX ADMIN — ASPIRIN 325 MG: 325 TABLET, DELAYED RELEASE ORAL at 09:04

## 2019-01-03 RX ADMIN — DIGOXIN 125 MCG: 125 TABLET ORAL at 09:05

## 2019-01-03 RX ADMIN — HEPARIN SODIUM 5000 UNITS: 5000 INJECTION INTRAVENOUS; SUBCUTANEOUS at 13:47

## 2019-01-03 RX ADMIN — TRAMADOL HYDROCHLORIDE 50 MG: 50 TABLET, COATED ORAL at 09:04

## 2019-01-03 RX ADMIN — FUROSEMIDE 40 MG: 40 TABLET ORAL at 09:04

## 2019-01-03 RX ADMIN — HEPARIN SODIUM 5000 UNITS: 5000 INJECTION INTRAVENOUS; SUBCUTANEOUS at 06:10

## 2019-01-03 RX ADMIN — MONTELUKAST SODIUM 10 MG: 10 TABLET, FILM COATED ORAL at 22:07

## 2019-01-03 RX ADMIN — HEPARIN SODIUM 5000 UNITS: 5000 INJECTION INTRAVENOUS; SUBCUTANEOUS at 22:17

## 2019-01-03 RX ADMIN — TRAMADOL HYDROCHLORIDE 50 MG: 50 TABLET, COATED ORAL at 12:16

## 2019-01-03 RX ADMIN — METHYLPREDNISOLONE SODIUM SUCCINATE 20 MG: 40 INJECTION, POWDER, FOR SOLUTION INTRAMUSCULAR; INTRAVENOUS at 22:09

## 2019-01-03 RX ADMIN — CLOPIDOGREL 75 MG: 75 TABLET, FILM COATED ORAL at 09:05

## 2019-01-03 RX ADMIN — TRAMADOL HYDROCHLORIDE 50 MG: 50 TABLET, COATED ORAL at 17:41

## 2019-01-03 RX ADMIN — METHYLPREDNISOLONE SODIUM SUCCINATE 20 MG: 40 INJECTION, POWDER, FOR SOLUTION INTRAMUSCULAR; INTRAVENOUS at 09:04

## 2019-01-03 RX ADMIN — FAMOTIDINE 40 MG: 20 TABLET ORAL at 09:05

## 2019-01-03 RX ADMIN — LEVOFLOXACIN 250 MG: 5 INJECTION, SOLUTION INTRAVENOUS at 20:28

## 2019-01-03 RX ADMIN — COLCHICINE 0.6 MG: 0.6 TABLET, FILM COATED ORAL at 09:04

## 2019-01-03 RX ADMIN — METHYLPREDNISOLONE SODIUM SUCCINATE 20 MG: 40 INJECTION, POWDER, FOR SOLUTION INTRAMUSCULAR; INTRAVENOUS at 16:36

## 2019-01-03 RX ADMIN — TRAMADOL HYDROCHLORIDE 50 MG: 50 TABLET, COATED ORAL at 22:07

## 2019-01-03 NOTE — SOCIAL WORK
Attempted to meet with patient to discuss the role of Care Management  Patient was sleeping soundly and not arousable  Spoke with his wife, Kvng Henry and daughter Jesusita Homans at [phone] and was informed patient and his wife moved in with their daughter a year ago  It is a 2 story home with a stair glide to get to the second floor  Patient recently started to uses a RW  He is independent of ADL's  He has been getting weaker recently  They are agreeable to SNF rehab if needed by patient  Wait PT/OT consult

## 2019-01-03 NOTE — OCCUPATIONAL THERAPY NOTE
[address] Evaluation     Patient Name: Haleigh Escamilla  FKARQ'L Date: 1/3/2019  Problem List  Patient Active Problem List   Diagnosis    Cellulitis    [facility]) ([facility] )    Chronic atrial fibrillation ([facility] )    Hypokalemia    Acute gouty arthritis    Nonrheumatic aortic valve stenosis    Senile dementia without behavioral disturbance     Past Medical History  Past Medical History:   Diagnosis Date    Aortic stenosis     Atrial fibrillation ([facility] )     Cardiac disease     [facility]     Hyperlipidemia     Hypertension     Kyphosis     Osteoarthritis     Prostate CA ([facility] )     Pulmonary hypertension ([facility] )     Renal disorder     Vitamin B12 deficiency      Past Surgical History  Past Surgical History:   Procedure Laterality Date    JOINT REPLACEMENT      PILONIDAL CYST / SINUS EXCISION      VARICOSE VEIN SURGERY        01/03/19 1030   Note Type   Note type Eval/Treat   Restrictions/Precautions   Weight Bearing Precautions Per Order No   Other Precautions Cognitive; Fall Risk   Pain Assessment   Pain Assessment Guthrie-Baker FACES   Guthrie-Baker FACES Pain Rating 2   Pain Location Foot;Generalized  (Generalized 2* arthritis)   Pain Orientation Right   Home Living   Type of Home House   Home Layout Two level;Bed/bath upstairs   Bathroom Shower/Tub Walk-in shower   Bathroom Toilet Standard   Bathroom Equipment Grab bars in shower;Built-in shower seat   Bathroom Accessibility Accessible   Home Equipment Walker;Stair glide   Additional Comments Pt reports he used a cane PTA, per family, patient was using a RW more recently  Prior Function   Level of [place] Independent with ADLs and functional mobility; Needs assistance with IADLs   Lives With Family; Spouse   Receives Help From Family   ADL Assistance Independent   IADLs Needs assistance   Comments Pt unable to get up from toilet x 2 hours PTA      Lifestyle   Autonomy Independent in ADLs   Reciprocal Relationships Lives with wife in daughters house  Psychosocial   Psychosocial (WDL) WDL   Subjective   Subjective "I don't think I'm going to be able to stand up"    ADL   Where Assessed Edge of bed   Eating Assistance 5  Supervision/Setup   Grooming Assistance 5  Supervision/Setup   UB Bathing Assistance 2  Maximal Assistance   LB Bathing Assistance 2  Maximal [address] 2  Maximal [address] 2  Maximal [address]  2  Maximal Assistance   Bed Mobility   Rolling L 4  Minimal assistance   Additional items Assist x 1;HOB elevated; Bedrails; Increased time required;Verbal cues   Transfers   Sit to Stand 2  Maximal assistance   Additional items Assist x 2;Bedrails; Increased time required;Verbal cues   Stand to Sit 3  Moderate assistance   Additional items Assist x 2;Armrests; Verbal cues; Increased time required   Stand pivot 3  Moderate assistance   Additional items Assist x 2; Increased time required  (RW)   Functional Mobility   Functional Mobility 3  Moderate assistance   Additional Comments Pt slightly retropulsive, cues to bear weight through arms on RW (initially lifts it up)    Balance   Static Sitting Good   Dynamic Sitting Fair +   Static Standing Fair -   Dynamic Standing Poor +   Activity Tolerance   Activity Tolerance Patient limited by fatigue;Patient limited by pain   Nurse Made Aware OK to see per RN   RUE Assessment   RUE Assessment WFL   LUE Assessment   LUE Assessment WFL   Hand Function   Gross Motor Coordination Functional   Fine Motor Coordination Functional   Vision-Basic Assessment   Current Vision Wears glasses all the time   Cognition   Overall Cognitive Status Impaired   Arousal/Participation Alert; Cooperative   Attention Difficulty attending to directions   Orientation Level Oriented X4   Memory Decreased recall of recent events;Decreased recall of biographical information;Decreased long term memory  (Pt denies using a RW, poor historian re: home setup)   Assessment   Limitation Decreased ADL status; Decreased Safe judgement during ADL;Decreased cognition;Decreased endurance;Decreased self-care trans;Decreased high-level ADLs   Prognosis Fair   Assessment Pt is a 80 y o  male seen for OT evaluation s/p admit to [facility] on 1/2/2019 w/ Cellulitis  Comorbidities affecting pt's functional performance at time of assessment include: MANDI, chronic afib, hypokalemia, acute gouty arthritis, nonrhumatic aortic valve stenosis, senile dementia without behavioral disturbance  Personal factors affecting pt at time of IE include:behavioral pattern, difficulty performing ADLS, difficulty performing IADLS , limited insight into deficits, decreased initiation and engagement  and health management   Prior to admission, pt was living in his [facility] with his wife, independent of ADLs and using a RW for functional mobility, however family reports he has been getting weaker recently  Upon evaluation: Pt requires max A x 2 for functional mobility and max A for ADLs 2* the following deficits impacting occupational performance: weakness, decreased ROM, decreased strength, decreased balance, decreased tolerance, impaired arousal, impaired attention, impaired initiation, impaired memory, impaired sequencing, impaired problem solving, impulsivity and decreased safety awareness  Pt to benefit from continued skilled OT tx while in the hospital to address deficits as defined above and maximize level of functional independence w ADL's and functional mobility  Occupational Performance areas to address include: grooming, bathing/shower, toilet hygiene, dressing, functional mobility, community mobility and clothing management  From OT standpoint, recommendation at time of d/c would be short term rehab  Goals   Patient Goals Does not state      Long Term Goal See below   Plan   Treatment Interventions ADL retraining;Functional transfer training;UE strengthening/ROM; Endurance training;Patient/family training;Equipment evaluation/education; Compensatory technique education;Continued evaluation   Goal Expiration Date 01/13/19   Treatment Day 1   OT Frequency 2-3x/wk   Recommendation   OT Discharge Recommendation Short Term Rehab   Barthel Index   Feeding 10   Bathing 0   Grooming Score 0   Dressing Score 5   Bladder Score 10   Bowels Score 10   Toilet Use Score 5   Transfers (Bed/Chair) Score 5   Mobility (Level Surface) Score 0   Stairs Score 0   Barthel Index Score 45       GOALS    1) Pt will improve activity tolerance to G for min 30 min txment sessions    2) Pt will complete UB/LB dressing/self care w/ mod I using adaptive device and DME as needed    3) Pt will complete bathing w/ Mod I w/ use of AE and DME as needed    4) Pt will complete toileting w/ mod I w/ G hygiene/thoroughness using DME as needed    5) Pt will improve functional transfers to Mod I on/off all surfaces using DME as needed w/ G balance/safety     6) Pt will improve functional mobility during ADL/IADL/leisure tasks to Mod I using DME as needed w/ G balance/safety     7) Pt will participate in simulated IADL management task to increase independence to Mod I w/ G safety and endurance    8) Pt will demonstrate G attention for 10 minutes during ongoing cognitive assessment to assist w/ safe d/c planning/recommendations    9) Pt will demonstrate G carryover of pt/caregiver education and training as appropriate w/ mod I w/o cues w/ good tolerance    10) Pt will demonstrate 100% carryover of energy conservation techniques w/ mod I t/o functional I/ADL/leisure tasks w/o cues s/p skilled education    AUDIE Rosa, OTR/L

## 2019-01-03 NOTE — UTILIZATION REVIEW
Initial Clinical Review  Admission: Date/Time/Statement: 1/2/19 @ 1733   Orders Placed This Encounter   Procedures    Inpatient Admission (expected length of stay for this patient is greater than two midnights)     Standing Status:   Standing     Number of Occurrences:   1     Order Specific Question:   Admitting Physician     Answer:   Jennifer Bethea [[id number]]     Order Specific Question:   Level of Care     Answer:   Med Surg [[id number]]     Order Specific Question:   Estimated length of stay     Answer:   More than 2 Midnights     Order Specific Question:   Certification     Answer:   I certify that inpatient services are medically necessary for this patient for a duration of greater than two midnights  See H&P and MD Progress Notes for additional information about the patient's course of treatment  ED: Date/Time/Mode of Arrival:   ED Arrival Information     Expected Arrival Acuity Means of Arrival Escorted By Service Admission Type    - 1/2/2019 15:41 Emergent Ambulance [facility]) General Medicine Emergency    Arrival Complaint    EDEMA      Chief Complaint:   Chief Complaint   Patient presents with    Cellulitis     Patient presents to the ER via ambulance with report that ems got called to the house for an assist off the toilet because of pain in right foot, report that the patient sat on toilet since 1230  strong urine odor in depends   Possible UTI   History of Illness: This is an 57-year-old male who presents from home via ambulance for evaluation of generalized weakness unable to get off the toilet and ambulate  He has a swollen painful erythematous right foot that has been increasing over the past week denies any fevers or chills he also has decreased p o  Intake and foul-smelling urine    Musculoskeletal: Right anterior foot erythema, edema, and warmth up to midshin   ED Vital Signs:   ED Triage Vitals   Temperature Pulse Respirations Blood Pressure SpO2   01/02/19 1549 01/02/19 1549 01/02/19 1549 01/02/19 1549 01/02/19 1549   99 6 °F (37 6 °C) (!) 109 18 132/80 97 %      Temp Source Heart Rate Source Patient Position - Orthostatic VS BP Location FiO2 (%)   01/02/19 1549 01/02/19 1549 01/02/19 1549 01/02/19 1549 --   Temporal Monitor Lying Right arm       Pain Score       01/02/19 2158       5        Wt Readings from Last 1 Encounters:   01/03/19 92 4 kg (203 lb 11 3 oz)   Vital Signs (abnormal): Abnormal Labs/Diagnostic Test Results:   SED RATE 54 WBC 11 08 HGB 11 3 K 3 2 CL 93 CO2 37 CR 1 32 AST 48 ALB 3 0 GFR 49 PT 15 3 INR 1 28   U/A+PROT, KETONES, BLOOD  CXR=No acute cardiopulmonary disease  R FOOT XRAY=No acute osseous abnormality  Moderate soft tissue swelling at the dorsum of the foot  Degenerative arthropathy in the midfoot and 1st MTP  EKG=Atrial fibrillation with premature ventricular or aberrantly conducted complexes  Left ventricular hypertrophy  Nonspecific ST and T wave abnormality  ED Treatment:   Medication Administration from 01/02/2019 1541 to 01/02/2019 1816       Date/Time Order Dose Route Action Action by Comments     01/02/2019 1617 sodium chloride 0 9 % infusion 125 mL/hr Intravenous [address] Claudius Dancer, RN      01/02/2019 1723 cefepime (MAXIPIME) 2 g/50 mL dextrose IVPB 0 mg Intravenous Stopped Tayo Perez RN      01/02/2019 1637 cefepime (MAXIPIME) 2 g/50 mL dextrose IVPB 2,000 mg Intravenous [address] Claudius Dancer, RN      01/02/2019 1724 vancomycin (VANCOCIN) 1,250 mg in sodium chloride 0 9 % 250 mL IVPB 1,250 mg Intravenous New Bag Tayo Perez RN      01/02/2019 1740 potassium chloride (K-DUR,KLOR-CON) CR tablet 40 mEq 40 mEq Oral Given Claudius Dancer, RN       Past Medical/Surgical History:    Active Ambulatory Problems     Diagnosis Date Noted    No Active Ambulatory Problems     Resolved Ambulatory Problems     Diagnosis Date Noted    No Resolved Ambulatory Problems     Past Medical History:   Diagnosis Date    Aortic stenosis     Atrial fibrillation [facility])     Cardiac disease     [facility]     Hyperlipidemia     Hypertension     Kyphosis     Osteoarthritis     [facility]     Pulmonary hypertension ([facility])     Renal disorder     Vitamin B12 deficiency    Admitting Diagnosis: Edema [R60 9]  Hypokalemia [E87 6]  Cellulitis of right foot [L03 115]  Age/Sex: 80 y o  male  Assessment/Plan:   Cellulitis   Assessment & Plan     -  vancomycin x1 dose given in the ED  -  Levaquin initiated, patient had angioedema with cephalosporins  -  blood cultures pending     Atrial fibrillation ([facility])   Assessment & Plan     -  Continue home digoxin, Metoprolol  -  Continue ASA/plavix  -  not currently on anticoagulation per home medication list  -  Telemetry x 24 hours   MANDI (acute kidney injury) ([facility] )   Assessment & Plan     -  May possibly be CKD, baseline renal function unknown  -  [facility]  -  Monitor renal function daily  - Accurate I& O     Leukocytosis   Assessment & Plan     -  continue to monitor white count and fever curve  -  suspect related to cellulitis  -  Urinalysis, blood cultures pending   Admission Orders:  TELEMETRY  PT/OT EVAL & TX  VENODYNES  Scheduled Meds:   Current Facility-Administered Medications:  aspirin 325 mg Oral Daily   atorvastatin 40 mg Oral Daily   clopidogrel 75 mg Oral Daily   colchicine 0 6 mg Oral Daily   digoxin 125 mcg Oral Daily   famotidine 40 mg Oral Daily   furosemide 40 mg Oral Daily   heparin (porcine) 5,000 Units Subcutaneous Q8H [address]   levofloxacin 250 mg Intravenous Q24H   methylPREDNISolone sodium succinate 20 mg Intravenous TID   metoprolol tartrate 50 mg Oral Daily   montelukast 10 mg Oral HS   traMADol 50 mg Oral 4x Daily   Continuous Infusions:    PRN Meds:   [address] Utilization Review Department  Phone: [phone]; Fax [phone]  [email or web address]  ATTENTION: Please call with any questions or concerns to [phone]  and carefully listen to the prompts so that you are directed to the right person  Send all requests for admission clinical reviews, approved or denied determinations and any other requests to fax [phone]   All voicemails are confidential

## 2019-01-03 NOTE — ED NOTES
Left foot red swollen warm to touch  Patient states that foot worsened "several days ago"  Denies any wounds, injuries  Patient was unable to get up today on his own  Family states that patient has been weaker over the past week, poor appetite, poor mobility  Difficult to care for at home       Lionel Mix RN  01/02/19 1936

## 2019-01-03 NOTE — ASSESSMENT & PLAN NOTE
-  Continue home digoxin, Metoprolol  -  Continue ASA/plavix  -  not currently on anticoagulation per home medication list  -  Rate controlled

## 2019-01-03 NOTE — ASSESSMENT & PLAN NOTE
I think pt's left foot erythema is more due to acute gouty arthritis than cellulitis but will continue IV levaquin for now      Pt will require >2 midnight hospital stay

## 2019-01-03 NOTE — ASSESSMENT & PLAN NOTE
After speaking with patient's son-in-law on the phone it is apparent that patient has loss of short-term memory and worsening cognitive function suggestive of senile dementia

## 2019-01-03 NOTE — PLAN OF CARE
Problem: OCCUPATIONAL THERAPY ADULT  Goal: Performs self-care activities at highest level of function for planned discharge setting  See evaluation for individualized goals  Treatment Interventions: ADL retraining, Functional transfer training, UE strengthening/ROM, Endurance training, Patient/family training, Equipment evaluation/education, Compensatory technique education, Continued evaluation          See flowsheet documentation for full assessment, interventions and recommendations  Outcome: Progressing  Limitation: Decreased ADL status, Decreased Safe judgement during ADL, Decreased cognition, Decreased endurance, Decreased self-care trans, Decreased high-level ADLs  Prognosis: Fair  Assessment: Pt is a 80 y o  male seen for OT evaluation s/p admit to [facility] on 1/2/2019 w/ Cellulitis  Comorbidities affecting pt's functional performance at time of assessment include: MANDI, chronic afib, hypokalemia, acute gouty arthritis, nonrhumatic aortic valve stenosis, senile dementia without behavioral disturbance  Personal factors affecting pt at time of IE include:behavioral pattern, difficulty performing ADLS, difficulty performing IADLS , limited insight into deficits, decreased initiation and engagement  and health management   Prior to admission, pt was living in his [facility] with his wife, independent of ADLs and using a RW for functional mobility, however family reports he has been getting weaker recently  Upon evaluation: Pt requires max A x 2 for functional mobility and max A for ADLs 2* the following deficits impacting occupational performance: weakness, decreased ROM, decreased strength, decreased balance, decreased tolerance, impaired arousal, impaired attention, impaired initiation, impaired memory, impaired sequencing, impaired problem solving, impulsivity and decreased safety awareness   Pt to benefit from continued skilled OT tx while in the hospital to address deficits as defined above and maximize level of functional independence w ADL's and functional mobility  Occupational Performance areas to address include: grooming, bathing/shower, toilet hygiene, dressing, functional mobility, community mobility and clothing management  From OT standpoint, recommendation at time of d/c would be short term rehab        OT Discharge Recommendation: Short Term Rehab

## 2019-01-03 NOTE — PLAN OF CARE
DISCHARGE PLANNING     Discharge to home or other facility with appropriate resources Progressing        DISCHARGE PLANNING - CARE MANAGEMENT     Discharge to post-acute care or home with appropriate resources Progressing        INFECTION - ADULT     Absence or prevention of progression during hospitalization Progressing        Knowledge Deficit     Patient/family/caregiver demonstrates understanding of disease process, treatment plan, medications, and discharge instructions Progressing        METABOLIC, FLUID AND ELECTROLYTES - ADULT     Electrolytes maintained within normal limits Progressing        PAIN - ADULT     Verbalizes/displays adequate comfort level or baseline comfort level Progressing        Potential for Falls     Patient will remain free of falls Progressing        Prexisting or High Potential for Compromised Skin Integrity     Skin integrity is maintained or improved Progressing        SAFETY ADULT     Patient will remain free of falls Progressing        SKIN/TISSUE INTEGRITY - ADULT     Skin integrity remains intact Progressing     Incision(s), wounds(s) or drain site(s) healing without S/S of infection Progressing

## 2019-01-03 NOTE — PROGRESS NOTES
Progress Note - Honor Carlotta 1934, 80 y o  male MRN: [id number]    Unit/Bed#: [address] [place] [address] Encounter: [id number]    Primary Care Provider: Marlin Resendiz DO   Date and time admitted to hospital: 1/2/2019  3:42 PM        Acute gouty arthritis   Assessment & Plan    -  Continue colchicine and solumedrol  -  Pain improved today     Senile dementia without behavioral disturbance   Assessment & Plan    -  Appears to be at baseline       Nonrheumatic aortic valve stenosis   Assessment & Plan    -  ECHO pending  -  4/6 systolic murmur       Hypokalemia   Assessment & Plan    -  potassium repleted in ED  -  daily BMP  -  goal potassium greater than 4     Chronic atrial fibrillation (HCC)   Assessment & Plan    -  Continue home digoxin, Metoprolol  -  Continue ASA/plavix  -  not currently on anticoagulation per home medication list  -  Rate controlled     MANDI (acute kidney injury) ([facility] )   Assessment & Plan    -  BUN 14 Creatinine 1 22 improved  -  Monitor renal function daily  -  Accurate I& O     * Cellulitis   Assessment & Plan    -  Continue levaquin: cellulitis vs gouty arthritis  -  blood cultures pending  -           Subjective/Objective     Subjective:   "I'm okay "    Objective:  Vitals: Blood pressure 105/58, pulse 74, temperature 97 9 °F (36 6 °C), temperature source Oral, resp  rate 16, height 5' 1 2" (1 554 m), weight 92 4 kg (203 lb 11 3 oz), SpO2 98 %  ,Body mass index is 38 24 kg/m²  Intake/Output Summary (Last 24 hours) at 01/03/19 1040  Last data filed at 01/03/19 1003   Gross per 24 hour   Intake             1155 ml   Output              150 ml   Net             1005 ml       Invasive Devices     Peripheral Intravenous Line            Peripheral IV 01/02/19 Right Forearm less than 1 day              Physical Exam   Constitutional: He is oriented to person, place, and time  Vital signs are normal  He is cooperative  Poor hygiene   Eyes: Pupils are equal, round, and reactive to light  Conjunctivae are normal    Neck: Neck supple  Cardiovascular: Normal rate  An irregular rhythm present  Pulses:       Dorsalis pedis pulses are 1+ on the right side  Right foot +2 pitting edema, Left +1 edema     Pulmonary/Chest: Effort normal and breath sounds normal    Abdominal: Soft  Normal appearance and bowel sounds are normal    Feet:   Right Foot:   Skin Integrity: Positive for erythema and warmth  Neurological: He is alert and oriented to person, place, and time  Short term memory loss,    Skin: Skin is warm and dry  Lab, Imaging and other studies: I have personally reviewed pertinent reports      VTE Pharmacologic Prophylaxis: Heparin  VTE Mechanical Prophylaxis: sequential compression device

## 2019-01-03 NOTE — PHYSICAL THERAPY NOTE
PT eval/tx   01/03/19 1028   Note Type   Note type Eval/Treat   Pain Assessment   Pain Assessment Guthrie-Baker FACES   Guthrie-Baker FACES Pain Rating 2   Pain Location Foot;Generalized   Pain Orientation Right   Home Living   Type of Home House   Home Layout Two level   Home Equipment Walker;Cane;Stair glide   Additional Comments cane pta only RW occas but more past few days   Prior Function   Level of [place] Independent with ADLs and functional mobility   Lives With Family; Spouse   Receives Help From Family   ADL Assistance Independent   IADLs Needs assistance   Comments Pt unable to arise form toilet x 2 hours pta   Restrictions/Precautions   Weight Bearing Precautions Per Order No   Other Precautions Cognitive; Fall Risk   General   Additional Pertinent History adm with cellulitis r/o gouty arthritis R foot   Family/Caregiver Present No   Cognition   Overall Cognitive Status Impaired   Attention Difficulty attending to directions   Orientation Level Oriented X4   Memory Decreased recall of precautions   Following Commands Follows one step commands with increased time or repetition   RUE Assessment   RUE Assessment WFL   LUE Assessment   LUE Assessment WFL   RLE Assessment   RLE Assessment WFL  (L ankle foot red swollen df to neutral strength 3+/5)   LLE Assessment   LLE Assessment WFL  (knee enlarged stregnth 3+/5)   Coordination   Movements are Fluid and Coordinated 0   Coordination and Movement Description bradykinetic   Bed Mobility   Rolling L 4  Minimal assistance   Additional items Assist x 1;HOB elevated; Bedrails;LE management   Supine to Sit 4  Minimal assistance   Additional items Assist x 1; Increased time required;LE management   Transfers   Sit to Stand 2  Maximal assistance   Additional items Assist x 2   Stand to Sit 3  Moderate assistance   Additional items Assist x 2   Stand pivot 3  Moderate assistance   Additional items Assist x 2  (rw hips knees flexed)   Ambulation/Elevation   Gait pattern Forward Flexion; Wide KYM; Decreased foot clearance; Inconsistent karin; Short stride; Improper Weight shift   Gait Assistance 3  Moderate assist   Additional items Assist x 2   Assistive Device Rolling walker   Distance 5'   Balance   Static Sitting Good   Dynamic Sitting Fair +   Static Standing Fair -   Dynamic Standing Poor +   Ambulatory Poor +   Endurance Deficit   Endurance Deficit Yes   Endurance Deficit Description tires quickly   Activity Tolerance   Activity Tolerance Patient limited by fatigue   Nurse Made Aware yes   Assessment   Prognosis Good   Problem List Decreased strength;Decreased endurance; Impaired balance;Decreased mobility; Decreased coordination;Decreased skin integrity; Impaired judgement;Decreased safety awareness   Assessment Pt presents with imapired strength, endurance, balance and gait after adm for cellulitis R foot  Can benefit from skilled PT services to regain safe ind functional mobility  REcommend shor tterm in-pt rehab atd/c to maximize functional recovery  Will follow see goals   Barriers to Discharge (medical status)   Goals   Patient Goals not stated   STG Expiration Date 01/13/19   Short Term Goal #1 1) improve strength 1/2 grade B les 2) imrove balance to fair+ 3) safe idn trnasfers 4) safe ind amb with rw 50' level   Plan   Treatment/Interventions ADL retraining;Functional transfer training;LE strengthening/ROM; Therapeutic exercise; Endurance training;Cognitive reorientation;Patient/family training;Equipment eval/education;Gait training;Spoke to nursing;Spoke to case management;OT   PT Frequency 5x/wk   Recommendation   Recommendation Short-term skilled PT   Equipment Recommended Walker   PT - OK to Discharge No   Barthel Index   Feeding 10   Bathing 0   Grooming Score 0   Dressing Score 5   Bladder Score 10   Bowels Score 10   Toilet Use Score 5   Transfers (Bed/Chair) Score 5   Mobility (Level Surface) Score 0   Stairs Score 0   Barthel Index Score 45   Tracy Alvares, PT

## 2019-01-03 NOTE — PLAN OF CARE
Problem: PHYSICAL THERAPY ADULT  Goal: Performs mobility at highest level of function for planned discharge setting  See evaluation for individualized goals  Treatment/Interventions: ADL retraining, Functional transfer training, LE strengthening/ROM, Therapeutic exercise, Endurance training, Cognitive reorientation, Patient/family training, Equipment eval/education, Gait training, Spoke to nursing, Spoke to case management, OT  Equipment Recommended: Rita Stanford       See flowsheet documentation for full assessment, interventions and recommendations  Outcome: Progressing  Prognosis: Good  Problem List: Decreased strength, Decreased endurance, Impaired balance, Decreased mobility, Decreased coordination, Decreased skin integrity, Impaired judgement, Decreased safety awareness  Assessment: Pt presents with imapired strength, endurance, balance and gait after adm for cellulitis R foot  Can benefit from skilled PT services to regain safe ind functional mobility  REcommend shor tterm in-pt rehab atd/c to maximize functional recovery  Will follow see goals  Barriers to Discharge:  (medical status)     Recommendation: Short-term skilled PT     PT - OK to Discharge: No    See flowsheet documentation for full assessment

## 2019-01-03 NOTE — PROGRESS NOTES
Progress Note - Honor Carlotta 1934, 80 y o  male MRN: [id number]    Unit/Bed#: [address] [place] [address] Encounter: [id number]    Primary Care Provider: Marlin Resendiz DO   Date and time admitted to hospital: 1/2/2019  3:42 PM        * Cellulitis   Assessment & Plan    I think pt's left foot erythema is more due to acute gouty arthritis than cellulitis but will continue IV levaquin for now  Pt will require >2 midnight hospital stay     Acute gouty arthritis   Assessment & Plan    Will treat pt iv steroids and colchicine     Chronic atrial fibrillation (HCC)   Assessment & Plan    Sounds like afib is chronic, will continue meroprolol for rate control     MANDI (acute kidney injury) ([facility] [address] )   Assessment & Plan    It's unkown if renal impairment is acute or chronic, will monitor renal fxn, check UA w microscopy     Hypokalemia   Assessment & Plan    Will replace K+ by mouth and monitor level     Nonrheumatic aortic valve stenosis   Assessment & Plan    Sounds like he has aortic stenosis, will get echo     Senile dementia without behavioral disturbance   Assessment & Plan    After speaking with patient's son-in-law on the phone it is apparent that patient has loss of short-term memory and worsening cognitive function suggestive of senile dementia         VTE Prophylaxis: in place    Patient Centered Rounds: I rounded with patient's nurse    Current Length of Stay: 0 day(s)    Current Patient Status: Inpatient    Certification Statement: Pt requires additional inpatient hospital stay due to: see assessment and plan        Subjective:   Patient is a poor historian suspect due to senile dementia but it is apparent that he has had right ankle and right foot the pain worse when he walks which may be going on at least for 3 or 4 days    I spoke with patient's son on to get history told me that according to him it started on Sunday and has been getting progressively worse to the point that patient was not able to stand up from the toilet today that is why he was brought to the ER for evaluation  Patient denies any fevers and chills, son-in-law reports history of previous gouty attacks  Currently patient denies any chest pain, palpitations, shortness of breath, nausea, abdominal pain, dysuria  According to son-in-law patient difficulty urinating before  All other ROS are negative    Objective:     Vitals:   Temp (24hrs), Av 2 °F (36 8 °C), Min:96 7 °F (35 9 °C), Max:99 6 °F (37 6 °C)    Temp:  [96 7 °F (35 9 °C)-99 6 °F (37 6 °C)] 96 7 °F (35 9 °C)  HR:  [] 94  Resp:  [18] 18  BP: (123-132)/(63-80) 123/63  SpO2:  [97 %] 97 %  Body mass index is 36 42 kg/m²  Input and Output Summary (last 24 hours):     No intake or output data in the 24 hours ending 19    Physical Exam:     Physical Exam   Constitutional: He appears well-developed  No distress  HENT:   Head: Normocephalic  Mouth/Throat: Oropharynx is clear and moist    Eyes: Conjunctivae are normal    Neck: Neck supple  No JVD present  Cardiovascular: Normal rate  Irregular irregular, controlled rate, for 4/6 systolic ejection murmur is heard   Pulmonary/Chest: Effort normal  No respiratory distress  He has no wheezes  He has no rales  Abdominal: Soft  Bowel sounds are normal  He exhibits no distension  There is no tenderness  Musculoskeletal: He exhibits tenderness (Patient's right sided medial ankle and right knee 1st MTP joints are tender)  Lymphadenopathy:     He has no cervical adenopathy  Neurological: He is alert  No cranial nerve deficit  Skin: Skin is warm and dry  Erythema is seen over the metatarsal heads on either right foot as well as the right medial ankle  Psychiatric: He has a normal mood and affect  Vitals reviewed  I personally reviewed labs and imaging reports for today        Last 24 Hours Medication List:     Current Facility-Administered Medications:  [START ON 1/3/2019] aspirin 325 mg Oral Daily  Jacquelyn Obando   [START ON 1/3/2019] atorvastatin 40 mg Oral Daily April D Ricardo, [address]   [START ON 1/3/2019] clopidogrel 75 mg Oral Daily April D Ricardo, [address]   [START ON 1/3/2019] digoxin 125 mcg Oral Daily April D Ricardo, [address]   [START ON 1/3/2019] famotidine 40 mg Oral Daily April D Simons, [address]   [START ON 1/3/2019] furosemide 40 mg Oral Daily April D JODY Silva   heparin (porcine) 5,000 Units Subcutaneous [facility] April D JODY Silva   levofloxacin 250 mg Intravenous Q24H April D JODY Silva   methylPREDNISolone sodium succinate 20 mg Intravenous TID Elver Mendoza MD   [START ON 1/3/2019] metoprolol tartrate 50 mg Oral Daily April D JODY Silva   montelukast 10 mg Oral HS April D JODY Silva   traMADol 50 mg Oral 4x Daily April D JODY Mccormick          Today, Patient Was Seen By: Elver Mendoza MD    ** Please Note: Dictation voice to text software may have been used in the creation of this document   **

## 2019-01-03 NOTE — SOCIAL WORK
Continuing to follow, as per Kristen Sevilla of PT and Evi Nunez of OT patient will benefit from SNF rehab  Spoke with patient's daughter and son in law and they are agreeable to SNF rehab  [place] given and they request a referral to [place] [id number] and [facility] be made  Referral sent via [address]  Wait bed availability

## 2019-01-04 PROBLEM — M10.9 ACUTE GOUTY ARTHRITIS: Status: ACTIVE | Noted: 2019-01-04

## 2019-01-04 PROCEDURE — 99232 SBSQ HOSP IP/OBS MODERATE 35: CPT | Performed by: INTERNAL MEDICINE

## 2019-01-04 RX ORDER — PREDNISONE 20 MG/1
40 TABLET ORAL DAILY
Status: DISCONTINUED | OUTPATIENT
Start: 2019-01-04 | End: 2019-01-07 | Stop reason: HOSPADM

## 2019-01-04 RX ORDER — DOCUSATE SODIUM 100 MG/1
100 CAPSULE, LIQUID FILLED ORAL 2 TIMES DAILY
Status: DISCONTINUED | OUTPATIENT
Start: 2019-01-04 | End: 2019-01-07 | Stop reason: HOSPADM

## 2019-01-04 RX ADMIN — FAMOTIDINE 40 MG: 20 TABLET ORAL at 08:34

## 2019-01-04 RX ADMIN — ASPIRIN 325 MG: 325 TABLET, DELAYED RELEASE ORAL at 08:35

## 2019-01-04 RX ADMIN — HEPARIN SODIUM 5000 UNITS: 5000 INJECTION INTRAVENOUS; SUBCUTANEOUS at 21:35

## 2019-01-04 RX ADMIN — DIGOXIN 125 MCG: 125 TABLET ORAL at 08:35

## 2019-01-04 RX ADMIN — COLCHICINE 0.6 MG: 0.6 TABLET, FILM COATED ORAL at 08:35

## 2019-01-04 RX ADMIN — TRAMADOL HYDROCHLORIDE 50 MG: 50 TABLET, COATED ORAL at 21:39

## 2019-01-04 RX ADMIN — MONTELUKAST SODIUM 10 MG: 10 TABLET, FILM COATED ORAL at 21:35

## 2019-01-04 RX ADMIN — FUROSEMIDE 40 MG: 40 TABLET ORAL at 08:34

## 2019-01-04 RX ADMIN — METHYLPREDNISOLONE SODIUM SUCCINATE 20 MG: 40 INJECTION, POWDER, FOR SOLUTION INTRAMUSCULAR; INTRAVENOUS at 08:34

## 2019-01-04 RX ADMIN — METOPROLOL TARTRATE 50 MG: 50 TABLET, FILM COATED ORAL at 08:35

## 2019-01-04 RX ADMIN — TRAMADOL HYDROCHLORIDE 50 MG: 50 TABLET, COATED ORAL at 08:35

## 2019-01-04 RX ADMIN — TRAMADOL HYDROCHLORIDE 50 MG: 50 TABLET, COATED ORAL at 13:15

## 2019-01-04 RX ADMIN — ATORVASTATIN CALCIUM 40 MG: 40 TABLET, FILM COATED ORAL at 08:34

## 2019-01-04 RX ADMIN — CLOPIDOGREL 75 MG: 75 TABLET, FILM COATED ORAL at 08:34

## 2019-01-04 RX ADMIN — DOCUSATE SODIUM 100 MG: 100 CAPSULE, LIQUID FILLED ORAL at 13:15

## 2019-01-04 RX ADMIN — DOCUSATE SODIUM 100 MG: 100 CAPSULE, LIQUID FILLED ORAL at 18:20

## 2019-01-04 RX ADMIN — HEPARIN SODIUM 5000 UNITS: 5000 INJECTION INTRAVENOUS; SUBCUTANEOUS at 07:13

## 2019-01-04 RX ADMIN — HEPARIN SODIUM 5000 UNITS: 5000 INJECTION INTRAVENOUS; SUBCUTANEOUS at 13:15

## 2019-01-04 NOTE — ASSESSMENT & PLAN NOTE
Echo shows severe aortic stenosis, he has no evidence of volume overload    Lungs are clear to auscultation he is not hypoxic on room air

## 2019-01-04 NOTE — SOCIAL WORK
Continuing to follow patient  As per Dr Alysa Michel patient is ready for discharge to SNF for rehab  Patient is approved at both [facility] and [facility] with patient's daughter, Javi Good and she prefers [facility]  Referral to [facility] and obtained pending auth number of [id number] from [place] at [phone] and was informed they have 24 hours to reach out to us for PT/OT review  Notified patient's daughter, Javi Good of the above

## 2019-01-04 NOTE — PROGRESS NOTES
Progress Note - Boston Villarreal 1934, 80 y o  male MRN: [id number]    Unit/Bed#: [address] [place] [address] Encounter: [id number]    Primary Care Provider: Ariana Segura DO   Date and time admitted to hospital: 2019  3:42 PM        * Acute gouty arthritis   Assessment & Plan    Patient's right foot acute gouty arthritis could nicely getting better will switch patient to p o  Prednisone and will stop colchicine  Patient's gait dysfunction and needs SNF rehab  I discussed this with patient's daughter on the phone in detail    Patient does not have cellulitis will stop Levaquin     Chronic atrial fibrillation [facility])   Assessment & Plan    Patient's heart rate is well controlled on metoprolol     MANDI (acute kidney injury) [facility])   Assessment & Plan    Patient probably has stage III chronic disease creatinine is 1 2 to today     Hypokalemia   Assessment & Plan    Hypokalemia was present admission resolved potassium is 3 7 yesterday     Aortic stenosis, severe   Assessment & Plan    Echo shows severe aortic stenosis, he has no evidence of volume overload  Lungs are clear to auscultation he is not hypoxic on room air     Senile dementia without behavioral disturbance   Assessment & Plan    Patient's senile dementia is at baseline       VTE Prophylaxis: in place    Patient Centered Rounds: I rounded with patient's nurse    Current Length of Stay: 2 day(s)    Current Patient Status: Inpatient    Certification Statement: Pt requires additional inpatient hospital stay due to: see assessment and plan        Subjective:   Patient's right foot pain is much improved    Denies any chest pain, shortness of breath, abdominal pain, palpitations, dysuria    All other ROS are negative    Objective:     Vitals:   Temp (24hrs), Av 6 °F (36 4 °C), Min:97 4 °F (36 3 °C), Max:98 °F (36 7 °C)    Temp:  [97 4 °F (36 3 °C)-98 °F (36 7 °C)] 97 5 °F (36 4 °C)  HR:  [76-85] 85  Resp:  [16-18] 16  BP: (107-128)/(55-66) 128/66  SpO2:  [97 %] 97 %  Body mass index is 39 11 kg/m²  Input and Output Summary (last 24 hours): Intake/Output Summary (Last 24 hours) at 01/04/19 1159  Last data filed at 01/04/19 1134   Gross per 24 hour   Intake              540 ml   Output              825 ml   Net             -285 ml       Physical Exam:     Physical Exam   Constitutional: He appears well-developed  No distress  HENT:   Head: Normocephalic  Mouth/Throat: Oropharynx is clear and moist    Eyes: Conjunctivae are normal    Neck: Neck supple  No JVD present  Cardiovascular: Normal rate  Murmur heard  Irregularly irregular   Pulmonary/Chest: Effort normal  No respiratory distress  He has no wheezes  He has no rales  Abdominal: Soft  Bowel sounds are normal  He exhibits no distension  There is no tenderness  Musculoskeletal: He exhibits tenderness (right ankle and mTP jpint tenderness and erythema are much improved today)  Lymphadenopathy:     He has no cervical adenopathy  Neurological: He is alert  No cranial nerve deficit  Skin: Skin is warm and dry  No rash noted  Psychiatric: He has a normal mood and affect  Vitals reviewed  I personally reviewed labs and imaging reports for today        Last 24 Hours Medication List:     Current Facility-Administered Medications:  aspirin 325 mg Oral Daily April JODY Adams   atorvastatin 40 mg Oral Daily April JODY Adams   clopidogrel 75 mg Oral Daily April JODY Adams   digoxin 125 mcg Oral Daily April JODY Adams   docusate sodium 100 mg Oral BID Cecilia Mehta MD   famotidine 40 mg Oral Daily April JODY Adams   furosemide 40 mg Oral Daily April JODY Adams   heparin (porcine) 5,000 Units Subcutaneous [facility] [address] April JODY Adams   methylPREDNISolone sodium succinate 20 mg Intravenous TID Cecilia Mehta MD   metoprolol tartrate 50 mg Oral Daily April JODY Adams   montelukast 10 mg Oral HS April JODY Adams   predniSONE 40 mg Oral Daily Cecilia Mehta MD traMADol 50 mg Oral 4x Daily April D JODY Silva          Today, Patient Was Seen By: Naomi Ovalles MD    ** Please Note: Dictation voice to text software may have been used in the creation of this document   **

## 2019-01-04 NOTE — ASSESSMENT & PLAN NOTE
Patient's right foot acute gouty arthritis could nicely getting better will switch patient to p o  Prednisone and will stop colchicine  Patient's gait dysfunction and needs SNF rehab    I discussed this with patient's daughter on the phone in detail    Patient does not have cellulitis will stop Levaquin

## 2019-01-05 PROCEDURE — 99232 SBSQ HOSP IP/OBS MODERATE 35: CPT | Performed by: INTERNAL MEDICINE

## 2019-01-05 RX ORDER — TRAMADOL HYDROCHLORIDE 50 MG/1
50 TABLET ORAL EVERY 12 HOURS PRN
Status: DISCONTINUED | OUTPATIENT
Start: 2019-01-05 | End: 2019-01-07 | Stop reason: HOSPADM

## 2019-01-05 RX ORDER — ALBUTEROL SULFATE 2.5 MG/3ML
SOLUTION RESPIRATORY (INHALATION)
Status: DISCONTINUED
Start: 2019-01-05 | End: 2019-01-05 | Stop reason: WASHOUT

## 2019-01-05 RX ORDER — SODIUM CHLORIDE FOR INHALATION 0.9 %
VIAL, NEBULIZER (ML) INHALATION
Status: DISCONTINUED
Start: 2019-01-05 | End: 2019-01-05 | Stop reason: WASHOUT

## 2019-01-05 RX ADMIN — ATORVASTATIN CALCIUM 40 MG: 40 TABLET, FILM COATED ORAL at 08:28

## 2019-01-05 RX ADMIN — HEPARIN SODIUM 5000 UNITS: 5000 INJECTION INTRAVENOUS; SUBCUTANEOUS at 13:45

## 2019-01-05 RX ADMIN — CLOPIDOGREL 75 MG: 75 TABLET, FILM COATED ORAL at 08:27

## 2019-01-05 RX ADMIN — DOCUSATE SODIUM 100 MG: 100 CAPSULE, LIQUID FILLED ORAL at 17:23

## 2019-01-05 RX ADMIN — FAMOTIDINE 40 MG: 20 TABLET ORAL at 08:27

## 2019-01-05 RX ADMIN — ASPIRIN 325 MG: 325 TABLET, DELAYED RELEASE ORAL at 08:27

## 2019-01-05 RX ADMIN — HEPARIN SODIUM 5000 UNITS: 5000 INJECTION INTRAVENOUS; SUBCUTANEOUS at 23:13

## 2019-01-05 RX ADMIN — PREDNISONE 40 MG: 20 TABLET ORAL at 08:27

## 2019-01-05 RX ADMIN — MONTELUKAST SODIUM 10 MG: 10 TABLET, FILM COATED ORAL at 23:13

## 2019-01-05 RX ADMIN — FUROSEMIDE 40 MG: 40 TABLET ORAL at 08:28

## 2019-01-05 RX ADMIN — DOCUSATE SODIUM 100 MG: 100 CAPSULE, LIQUID FILLED ORAL at 08:28

## 2019-01-05 RX ADMIN — METOPROLOL TARTRATE 50 MG: 50 TABLET, FILM COATED ORAL at 08:27

## 2019-01-05 RX ADMIN — DIGOXIN 125 MCG: 125 TABLET ORAL at 08:27

## 2019-01-05 RX ADMIN — HEPARIN SODIUM 5000 UNITS: 5000 INJECTION INTRAVENOUS; SUBCUTANEOUS at 05:54

## 2019-01-05 RX ADMIN — TRAMADOL HYDROCHLORIDE 50 MG: 50 TABLET, COATED ORAL at 08:27

## 2019-01-05 NOTE — PROGRESS NOTES
Progress Note - Honor Carlotta 1934, 80 y o  male MRN: [id number]    Unit/Bed#: [address] [place] [address] Encounter: [id number]    Primary Care Provider: Marlin Resendiz DO   Date and time admitted to hospital: 2019  3:42 PM        * Acute gouty arthritis   Assessment & Plan    Patient's right foot acute gouty arthritis is improving, will continue p o  Prednisone  Patient has gait dysfunction and needs SNF rehab  We are awaiting insurance authorization for SNF placement  Levaquin was discontinued as patient has no cellulitis  Reason for admission was acute gouty arthritis with gait dysfunction     Chronic atrial fibrillation ([facility] [address] )   Assessment & Plan    Patient's heart rate is well controlled on metoprolol     Senile dementia without behavioral disturbance   Assessment & Plan    Patient's senile dementia is at baseline         VTE Prophylaxis: in place    Patient Centered Rounds: I rounded with patient's nurse    Current Length of Stay: 3 day(s)    Current Patient Status: Inpatient    Certification Statement: Pt requires additional inpatient hospital stay due to: see assessment and plan        Subjective:   Patient's right foot pain is gradually improving  Denies any diarrhea, chest pain, shortness of    All other ROS are negative    Objective:     Vitals:   Temp (24hrs), Av 9 °F (36 6 °C), Min:97 5 °F (36 4 °C), Max:98 2 °F (36 8 °C)    Temp:  [97 5 °F (36 4 °C)-98 2 °F (36 8 °C)] 98 1 °F (36 7 °C)  HR:  [75-94] 76  Resp:  [18-19] 19  BP: (118-122)/(57-71) 118/60  SpO2:  [93 %-97 %] 93 %  Body mass index is 39 11 kg/m²  Input and Output Summary (last 24 hours): Intake/Output Summary (Last 24 hours) at 19 1255  Last data filed at 19 1134   Gross per 24 hour   Intake              480 ml   Output             1645 ml   Net            -1165 ml       Physical Exam:     Physical Exam   Constitutional: He appears well-developed  No distress  HENT:   Head: Normocephalic     Mouth/Throat: Oropharynx is clear and moist    Eyes: Conjunctivae are normal    Neck: Neck supple  No JVD present  Cardiovascular: Normal rate  Irregular with systolic ejection murmur   Pulmonary/Chest: Effort normal  No respiratory distress  He has no wheezes  He has no rales  Abdominal: Soft  Bowel sounds are normal  He exhibits no distension  There is no tenderness  Musculoskeletal: He exhibits no tenderness  Patient has minimal erythema the right foot and tenderness is markedly diminished   Lymphadenopathy:     He has no cervical adenopathy  Neurological: He is alert  No cranial nerve deficit  Skin: Skin is warm and dry  Psychiatric: He has a normal mood and affect  Vitals reviewed  I personally reviewed labs and imaging reports for today  Last 24 Hours Medication List:     Current Facility-Administered Medications:  aspirin 325 mg Oral Daily April D JODY Silva   atorvastatin 40 mg Oral Daily April D JODY Silva   clopidogrel 75 mg Oral Daily April D JODY Silva   digoxin 125 mcg Oral Daily April D JODY Silva   docusate sodium 100 mg Oral BID Rogerio Fofana MD   famotidine 40 mg Oral Daily April D JODY Silva   furosemide 40 mg Oral Daily April D JODY Silva   heparin (porcine) 5,000 Units Subcutaneous [facility] [address] April D JODY Silva   metoprolol tartrate 50 mg Oral Daily April D JODY Silva   montelukast 10 mg Oral HS April D JODY Silva   predniSONE 40 mg Oral Daily Rogerio Fofana MD   traMADol 50 mg Oral Q12H PRN Rogerio Fofana MD          Today, Patient Was Seen By: Rogerio Fofana MD    ** Please Note: Dictation voice to text software may have been used in the creation of this document   **

## 2019-01-05 NOTE — ASSESSMENT & PLAN NOTE
Patient's right foot acute gouty arthritis is improving, will continue p o  Prednisone  Patient has gait dysfunction and needs SNF rehab  We are awaiting insurance authorization for SNF placement  Levaquin was discontinued as patient has no cellulitis    Reason for admission was acute gouty arthritis with gait dysfunction

## 2019-01-06 LAB
ANION GAP SERPL CALCULATED.3IONS-SCNC: 5 MMOL/L (ref 4–13)
BUN SERPL-MCNC: 22 MG/DL (ref 5–25)
CALCIUM SERPL-MCNC: 8 MG/DL (ref 8.3–10.1)
CHLORIDE SERPL-SCNC: 101 MMOL/L (ref 100–108)
CO2 SERPL-SCNC: 35 MMOL/L (ref 21–32)
CREAT SERPL-MCNC: 1.07 MG/DL (ref 0.6–1.3)
GFR SERPL CREATININE-BSD FRML MDRD: 63 ML/MIN/1.73SQ M
GLUCOSE SERPL-MCNC: 106 MG/DL (ref 65–140)
POTASSIUM SERPL-SCNC: 3.1 MMOL/L (ref 3.5–5.3)
SODIUM SERPL-SCNC: 141 MMOL/L (ref 136–145)

## 2019-01-06 PROCEDURE — 97110 THERAPEUTIC EXERCISES: CPT

## 2019-01-06 PROCEDURE — 99222 1ST HOSP IP/OBS MODERATE 55: CPT | Performed by: INTERNAL MEDICINE

## 2019-01-06 PROCEDURE — 80048 BASIC METABOLIC PNL TOTAL CA: CPT | Performed by: INTERNAL MEDICINE

## 2019-01-06 PROCEDURE — 97530 THERAPEUTIC ACTIVITIES: CPT

## 2019-01-06 RX ORDER — POTASSIUM CHLORIDE 20 MEQ/1
20 TABLET, EXTENDED RELEASE ORAL DAILY
Status: DISCONTINUED | OUTPATIENT
Start: 2019-01-06 | End: 2019-01-07 | Stop reason: HOSPADM

## 2019-01-06 RX ADMIN — POTASSIUM CHLORIDE 20 MEQ: 1500 TABLET, EXTENDED RELEASE ORAL at 08:35

## 2019-01-06 RX ADMIN — HEPARIN SODIUM 5000 UNITS: 5000 INJECTION INTRAVENOUS; SUBCUTANEOUS at 21:33

## 2019-01-06 RX ADMIN — PREDNISONE 40 MG: 20 TABLET ORAL at 08:35

## 2019-01-06 RX ADMIN — ASPIRIN 325 MG: 325 TABLET, DELAYED RELEASE ORAL at 08:35

## 2019-01-06 RX ADMIN — DOCUSATE SODIUM 100 MG: 100 CAPSULE, LIQUID FILLED ORAL at 19:22

## 2019-01-06 RX ADMIN — ATORVASTATIN CALCIUM 40 MG: 40 TABLET, FILM COATED ORAL at 08:36

## 2019-01-06 RX ADMIN — METOPROLOL TARTRATE 50 MG: 50 TABLET, FILM COATED ORAL at 08:35

## 2019-01-06 RX ADMIN — DOCUSATE SODIUM 100 MG: 100 CAPSULE, LIQUID FILLED ORAL at 08:35

## 2019-01-06 RX ADMIN — FAMOTIDINE 40 MG: 20 TABLET ORAL at 08:35

## 2019-01-06 RX ADMIN — HEPARIN SODIUM 5000 UNITS: 5000 INJECTION INTRAVENOUS; SUBCUTANEOUS at 06:42

## 2019-01-06 RX ADMIN — CLOPIDOGREL 75 MG: 75 TABLET, FILM COATED ORAL at 08:35

## 2019-01-06 RX ADMIN — DIGOXIN 125 MCG: 125 TABLET ORAL at 08:35

## 2019-01-06 RX ADMIN — MONTELUKAST SODIUM 10 MG: 10 TABLET, FILM COATED ORAL at 21:32

## 2019-01-06 RX ADMIN — FUROSEMIDE 40 MG: 40 TABLET ORAL at 08:35

## 2019-01-06 RX ADMIN — HEPARIN SODIUM 5000 UNITS: 5000 INJECTION INTRAVENOUS; SUBCUTANEOUS at 14:23

## 2019-01-06 NOTE — PHYSICAL THERAPY NOTE
PT tx     01/06/19 1205   Pain Assessment   Pain Assessment No/denies pain   Pain Score No Pain   General   Chart Reviewed Yes   Cognition   Arousal/Participation Alert   Attention Within functional limits   Orientation Level Oriented to person;Oriented to place   Following Commands Follows one step commands with increased time or repetition   Subjective   Subjective Feels ok   Bed Mobility   Rolling L 5  Supervision   Additional items HOB elevated   Supine to Sit 4  Minimal assistance   Additional items Assist x 1   Transfers   Sit to Stand 4  Minimal assistance   Additional items Assist x 1; Increased time required;Armrests; Verbal cues   Stand to Sit 4  Minimal assistance   Additional items Assist x 1; Increased time required;Verbal cues   Stand pivot 4  Minimal assistance   Additional items Assist x 1;Bedrails;Armrests; Increased time required; Impulsive  (rw)   Ambulation/Elevation   Gait pattern Wide KYM; Forward Flexion; Improper Weight shift; Shuffling; Inconsistent karin; Foward flexed; Short stride   Gait Assistance 4  Minimal assist   Additional items Assist x 1   Assistive Device Rolling walker   Distance 10'   Balance   Static Sitting Good   Dynamic Sitting Fair +   Static Standing Fair   Dynamic Standing Fair -   Ambulatory Fair -   Endurance Deficit   Endurance Deficit No   Activity Tolerance   Activity Tolerance Patient tolerated treatment well   Exercises   Hip Flexion 10 reps; Sitting;AROM; Right;Left   Knee AROM Long Arc Quad 10 reps; Sitting;AROM; Right;Left   Ankle Pumps 10 reps; Sitting;AROM; Right;Left   Assessment   Prognosis Good   Problem List Decreased strength; Impaired balance;Decreased mobility; Decreased coordination;Decreased safety awareness   Assessment Better with transfers requires Ax1, close cues and assist for safe technique  Somwewhat impulsive unless cued  V cues to correct posture and use RW approrpaitely   Continue to recommend short term in-pt rehab atd/c    Goals   Patient Goals get better   Plan   Treatment/Interventions LE strengthening/ROM; Functional transfer training;ADL retraining; Therapeutic exercise;Patient/family training;Equipment eval/education;Gait training;Spoke to nursing   Progress Progressing toward goals   PT Frequency 5x/wk   Recommendation   Recommendation Short-term skilled PT   Equipment Recommended Juliann Saenz, PT

## 2019-01-06 NOTE — ASSESSMENT & PLAN NOTE
Patient was admitted with right foot acute gouty arthritis which is much improved on p o  Prednisone    Tomorrow should be stable for discharge to nursing home for short-term rehab once we get authorization from insurance company

## 2019-01-06 NOTE — H&P
H&P- Haleigh Escamilla 1934, 80 y o  male MRN: [id number]    Unit/Bed#: [address] [place] [address] Encounter: [id number]    Primary Care Provider: Alton Darnell DO   Date and time admitted to hospital: 2019  3:42 PM        * Acute gouty arthritis   Assessment & Plan    Patient was admitted with right foot acute gouty arthritis which is much improved on p o  Prednisone  Tomorrow should be stable for discharge to nursing home for short-term rehab once we get authorization from insurance company     Chronic atrial fibrillation [facility])   Assessment & Plan    Patient's heart rate is well controlled on metoprolol     MANDI (acute kidney injury) [facility])   Assessment & Plan    Patient probably has stage III chronic disease creatinine is 1 07 to today     Hypokalemia   Assessment & Plan    Potassium is 3 1 today will start patient on potassium chloride 20 mg daily since he is on Lasix     Aortic stenosis, severe   Assessment & Plan    Echo shows severe aortic stenosis, he has no evidence of volume overload  Lungs are clear to auscultation he is not hypoxic on room air     Senile dementia without behavioral disturbance   Assessment & Plan    Patient's senile dementia is at baseline           VTE Prophylaxis: in place    Patient Centered Rounds: I rounded with patient's nurse    Current Length of Stay: 4 day(s)    Current Patient Status: Inpatient    Certification Statement: Pt requires additional inpatient hospital stay due to: see assessment and plan        Subjective:   Patient denies any right foot pain, chest pain, palpitations, nausea, abdominal pain, diarrhea    All other ROS are negative    Objective:     Vitals:   Temp (24hrs), Av 8 °F (36 6 °C), Min:97 3 °F (36 3 °C), Max:98 1 °F (36 7 °C)    Temp:  [97 3 °F (36 3 °C)-98 1 °F (36 7 °C)] 98 °F (36 7 °C)  HR:  [76-86] 79  Resp:  [17-19] 18  BP: (106-128)/(56-73) 106/56  SpO2:  [93 %-98 %] 98 %  Body mass index is 39 11 kg/m²       Input and Output Summary (last 24 hours): Intake/Output Summary (Last 24 hours) at 01/06/19 0778  Last data filed at 01/06/19 0645   Gross per 24 hour   Intake              225 ml   Output             1370 ml   Net            -1145 ml       Physical Exam:     Physical Exam   Constitutional: He appears well-developed  No distress  HENT:   Head: Normocephalic  Mouth/Throat: Oropharynx is clear and moist    Eyes: Conjunctivae are normal    Neck: Neck supple  Cardiovascular: Normal rate  Irregular irregular with 3/6 systolic ejection murmur   Pulmonary/Chest: Effort normal  No respiratory distress  He has no wheezes  He has no rales  Abdominal: Soft  Bowel sounds are normal  He exhibits no distension  There is no tenderness  Musculoskeletal: He exhibits no tenderness  Patient's right ankle tenderness and right 1st and 2nd MTP joint tenderness resolved  There is no more   Lymphadenopathy:     He has no cervical adenopathy  Neurological: He is alert  No cranial nerve deficit  Skin: Skin is warm and dry  Psychiatric: He has a normal mood and affect  Vitals reviewed  I personally reviewed labs and imaging reports for today        Last 24 Hours Medication List:     Current Facility-Administered Medications:  aspirin 325 mg Oral Daily April D JODY Silva   atorvastatin 40 mg Oral Daily April D JODY Silva   clopidogrel 75 mg Oral Daily April D JODY Silva   digoxin 125 mcg Oral Daily April D JODY Silva   docusate sodium 100 mg Oral BID Jonathon Candelaria MD   famotidine 40 mg Oral Daily April D JODY Silva   furosemide 40 mg Oral Daily April JODY Adams   heparin (porcine) 5,000 Units Subcutaneous [facility] [address] April D JODY Silva   metoprolol tartrate 50 mg Oral Daily April D JODY Silva   montelukast 10 mg Oral HS April D JODY Silva   potassium chloride 20 mEq Oral Daily Jonathon Candelaria MD   predniSONE 40 mg Oral Daily Jonathon Candelaria MD   traMADol 50 mg Oral Q12H PRN Jonathon Candelaria MD          Today, Patient Was Seen By: Demetrice Rodriguez Eileen Tello MD    ** Please Note: Dictation voice to text software may have been used in the creation of this document   **

## 2019-01-06 NOTE — PLAN OF CARE
Problem: PHYSICAL THERAPY ADULT  Goal: Performs mobility at highest level of function for planned discharge setting  See evaluation for individualized goals  Treatment/Interventions: ADL retraining, Functional transfer training, LE strengthening/ROM, Therapeutic exercise, Endurance training, Cognitive reorientation, Patient/family training, Equipment eval/education, Gait training, Spoke to nursing, Spoke to case management, OT  Equipment Recommended: Sydnee Booth       See flowsheet documentation for full assessment, interventions and recommendations  Outcome: Progressing  Prognosis: Good  Problem List: Decreased strength, Impaired balance, Decreased mobility, Decreased coordination, Decreased safety awareness  Assessment: Better with transfers requires Ax1, close cues and assist for safe technique  Somwewhat impulsive unless cued  V cues to correct posture and use RW approrpaitely  Continue to recommend short term in-pt rehab atd/c   Barriers to Discharge:  (medical status)     Recommendation: Short-term skilled PT     PT - OK to Discharge: No    See flowsheet documentation for full assessment

## 2019-01-07 VITALS
WEIGHT: 208.34 LBS | OXYGEN SATURATION: 99 % | BODY MASS INDEX: 39.33 KG/M2 | SYSTOLIC BLOOD PRESSURE: 100 MMHG | RESPIRATION RATE: 16 BRPM | HEART RATE: 88 BPM | DIASTOLIC BLOOD PRESSURE: 55 MMHG | HEIGHT: 61 IN | TEMPERATURE: 97.8 F

## 2019-01-07 PROBLEM — E87.6 HYPOKALEMIA: Status: RESOLVED | Noted: 2019-01-02 | Resolved: 2019-01-07

## 2019-01-07 PROBLEM — N17.9 AKI (ACUTE KIDNEY INJURY) (HCC): Status: RESOLVED | Noted: 2019-01-02 | Resolved: 2019-01-07

## 2019-01-07 LAB
BACTERIA BLD CULT: NORMAL
BACTERIA BLD CULT: NORMAL

## 2019-01-07 PROCEDURE — 99239 HOSP IP/OBS DSCHRG MGMT >30: CPT | Performed by: INTERNAL MEDICINE

## 2019-01-07 RX ORDER — PREDNISONE 20 MG/1
40 TABLET ORAL DAILY
Refills: 0
Start: 2019-01-08 | End: 2019-01-12

## 2019-01-07 RX ORDER — POTASSIUM CHLORIDE 20 MEQ/1
40 TABLET, EXTENDED RELEASE ORAL ONCE
Status: COMPLETED | OUTPATIENT
Start: 2019-01-07 | End: 2019-01-07

## 2019-01-07 RX ADMIN — FUROSEMIDE 40 MG: 40 TABLET ORAL at 08:13

## 2019-01-07 RX ADMIN — FAMOTIDINE 40 MG: 20 TABLET ORAL at 08:13

## 2019-01-07 RX ADMIN — POTASSIUM CHLORIDE 20 MEQ: 1500 TABLET, EXTENDED RELEASE ORAL at 08:13

## 2019-01-07 RX ADMIN — HEPARIN SODIUM 5000 UNITS: 5000 INJECTION INTRAVENOUS; SUBCUTANEOUS at 13:12

## 2019-01-07 RX ADMIN — HEPARIN SODIUM 5000 UNITS: 5000 INJECTION INTRAVENOUS; SUBCUTANEOUS at 05:44

## 2019-01-07 RX ADMIN — POTASSIUM CHLORIDE 40 MEQ: 1500 TABLET, EXTENDED RELEASE ORAL at 13:12

## 2019-01-07 RX ADMIN — PREDNISONE 40 MG: 20 TABLET ORAL at 08:13

## 2019-01-07 RX ADMIN — DIGOXIN 125 MCG: 125 TABLET ORAL at 08:13

## 2019-01-07 RX ADMIN — ATORVASTATIN CALCIUM 40 MG: 40 TABLET, FILM COATED ORAL at 08:13

## 2019-01-07 RX ADMIN — METOPROLOL TARTRATE 50 MG: 50 TABLET, FILM COATED ORAL at 08:13

## 2019-01-07 RX ADMIN — ASPIRIN 325 MG: 325 TABLET, DELAYED RELEASE ORAL at 08:13

## 2019-01-07 RX ADMIN — DOCUSATE SODIUM 100 MG: 100 CAPSULE, LIQUID FILLED ORAL at 08:13

## 2019-01-07 RX ADMIN — CLOPIDOGREL 75 MG: 75 TABLET, FILM COATED ORAL at 08:13

## 2019-01-07 NOTE — PLAN OF CARE

## 2019-01-07 NOTE — NURSING NOTE
Report given to Mauricio Ferrer at receiving facility  No questions or concerns at this time  Son is here to transport patient  Patient is stable and dressed without any complaints at this time

## 2019-01-07 NOTE — UTILIZATION REVIEW
Continued Stay Review    Date: 01/06/19    Vital Signs: /59 (BP Location: Right arm)   Pulse 87   Temp (!) 97 4 °F (36 3 °C)   Resp 17   Ht 5' 1 2" (1 554 m)   Wt 94 5 kg (208 lb 5 4 oz)   SpO2 99%   BMI 39 11 kg/m²     Medications:   Scheduled Meds:   Current Facility-Administered Medications:  aspirin 325 mg Oral Daily   atorvastatin 40 mg Oral Daily   clopidogrel 75 mg Oral Daily   digoxin 125 mcg Oral Daily   docusate sodium 100 mg Oral BID   famotidine 40 mg Oral Daily   furosemide 40 mg Oral Daily   heparin (porcine) 5,000 Units Subcutaneous Q8H [facility]   metoprolol tartrate 50 mg Oral Daily   montelukast 10 mg Oral HS   potassium chloride 20 mEq Oral Daily   predniSONE 40 mg Oral Daily   traMADol 50 mg Oral Q12H PRN     Continuous Infusions:    PRN Meds: traMADol    Abnormal Labs/Diagnostic Results:   K 3 1  CO2 35  CA 8 0    Age/Sex: 80 y o  male     Assessment/Plan:   * Acute gouty arthritis   Assessment & Plan     Patient was admitted with right foot acute gouty arthritis which is much improved on p o  Prednisone  Tomorrow should be stable for discharge to nursing home for short-term rehab once we get authorization from insurance company      Chronic atrial fibrillation [facility])   Assessment & Plan     Patient's heart rate is well controlled on metoprolol      MANDI (acute kidney injury) ([facility] [address] )   Assessment & Plan     Patient probably has stage III chronic disease creatinine is 1 07 to today      Hypokalemia   Assessment & Plan     Potassium is 3 1 today will start patient on potassium chloride 20 mg daily since he is on Lasix      Aortic stenosis, severe   Assessment & Plan     Echo shows severe aortic stenosis, he has no evidence of volume overload    Lungs are clear to auscultation he is not hypoxic on room air      Senile dementia without behavioral disturbance   Assessment & Plan     Patient's senile dementia is at baseline         VTE Prophylaxis: in place  Certification Statement: Pt requires additional inpatient hospital stay due to: see assessment and plan    Discharge Plan: TBD    [address]  Utilization Review Department  Phone: [phone]; Fax [phone]  [email or web address]  ATTENTION: Please call with any questions or concerns to [phone]  and carefully listen to the prompts so that you are directed to the right person  Send all requests for admission clinical reviews, approved or denied determinations and any other requests to fax [phone]   All voicemails are confidential

## 2019-01-07 NOTE — DISCHARGE SUMMARY
Discharge- Mancil Scheuermann 1934, 80 y o  male MRN: [id number]    Unit/Bed#: [address] [place], [place] [address] Encounter: [id number]    Primary Care Provider: Romain Friend DO   Date and time admitted to hospital: 1/2/2019  3:42 PM        Senile dementia without behavioral disturbance   Assessment & Plan    -  Appears to be at baseline       Aortic stenosis, severe   Assessment & Plan      -  4/6 systolic murmur       Hypokalemia   Assessment & Plan    -  potassium repleted  -  daily BMP  -  goal potassium greater than 4     Chronic atrial fibrillation (HCC)   Assessment & Plan    -  Continue home digoxin, Metoprolol  -  Continue ASA/plavix  -  not currently on anticoagulation per home medication list  -  Rate controlled     MANDI (acute kidney injury) ([facility] [address] )   Assessment & Plan    -  BUN 22 Creatinine 1 07  -resolved  -  Accurate I& O     * Acute gouty arthritis   Assessment & Plan    On prednisone  Will complete the course  Primary Care Physician at Discharge:   JANKI Stokes,[phone]    HPI:   59-year-old male who presented to emergency department with complain of right foot pain, inability to ambulate  For a detailed HPI please refer to the admission note    Procedures Performed:   Orders Placed This Encounter   Procedures    ED ECG Documentation Only       Hospital Course:   Patient was admitted with acute gouty arthritis, acute kidney injury and hypokalemia  Patient was treated with IV steroids, colchicine, IV fluids  Acute kidney injury resolved  Patient's foot pain improved significantly  For a detail please refer to the assessment above  Patient will be discharged to [facility] skilled nursing facility for rehab  Patient is hemodynamically stable for discharge  Follow-up with PCP in 1 week      Consulting Providers   None    Complications:  None    Labs:   Lab Results   Component Value Date    WBC 8 66 01/03/2019    RBC 3 36 (L) 01/03/2019    HGB 10 2 (L) 01/03/2019    HCT 31 0 (L) 01/03/2019 MCV 92 01/03/2019    MCH 30 4 01/03/2019    RDW 12 7 01/03/2019     01/03/2019     Lab Results   Component Value Date    CREATININE 1 07 01/06/2019    BUN 22 01/06/2019    K 3 1 (L) 01/06/2019     01/06/2019    CO2 35 (H) 01/06/2019    ALKPHOS 71 01/02/2019    ALT 24 01/02/2019    AST 48 (H) 01/02/2019       Treatments:  Aspirin, Plavix, Lipitor, Lanoxin, Lasix, Lopressor, prednisone    Discharge Diagnosis:   Principal Problem:    Acute gouty arthritis  Active Problems:    MANDI (acute kidney injury) ([facility] [address] )    Chronic atrial fibrillation (HCC)    Hypokalemia    Aortic stenosis, severe    Senile dementia without behavioral disturbance  Resolved Problems:    * No resolved hospital problems  *      Condition at Discharge:   Good     Code Status: Level 1 - Full Code  Advance Directive and Living Will: <no information>  Power of :    POLST:      Discharge instructions/Information to patient and family:   See after visit summary for information provided to patient and family  Provisions for Follow-Up Care:  See after visit summary for information related to follow-up care and any pertinent home health orders  Disposition:   Skilled nursing facility at [facility]    Planned Readmission:   No    Discharge Statement   I spent 35 minutes discharging the patient  This time was spent on the day of discharge  I had direct contact with the patient on the day of discharge  Greater than 50% of the total time was spent examining patient, answering all patient questions, arranging and discussing plan of care with patient as well as directly providing post-discharge instructions  Additional time then spent on discharge activities  Discharge Medications:  See after visit summary for reconciled discharge medications provided to patient and family        "This note has been constructed using a voice recognition system"    Yash Young MD  1/7/2019,4:45 PM

## 2019-01-07 NOTE — SOCIAL WORK
After placing several phone calls to [facility] and speaking with Derinda Dancer and Carmel Trevino of [facility]  was able to obtain 7 skilled rehab days for patient with Nila Ramirez # of [id number] with next review date 1/13/18 to Ardyce Seip at [phone] and [address] [place] [address] number [phone]  Spoke with patient's son in law and the family can transport patient to [place] [address] ar 6:30-7:oo pm tonight  Confirmed above with  Rosalio Pelletier at [place] [address] and Jennifer Doan, his nurse

## 2019-01-07 NOTE — SOCIAL WORK
Continuing to follow patient  Spoke with Celsa Raines of [facility] and was given informatio that the case was assigned to Priscilla Gee  for SNF review  Faxed request for SNF rehab to Cortney Liu at [phone]  PT/OT notes were faxed, wait approval for SNF days

## 2019-01-08 NOTE — UTILIZATION REVIEW
Auth:   [id number]    Notification of Discharge  This is a Notification of Discharge from our facility [address]  Please be advised that this patient has been discharge from our facility  Below you will find the admission and discharge date and time including the patients disposition  PRESENTATION DATE: 1/2/2019  3:42 PM  IP ADMISSION DATE: 1/2/19 1733  DISCHARGE DATE: 1/7/2019  7:07 PM  DISPOSITION: Non [facility] SNF/TCU/SNU    [address] Utilization Review Department  Phone: [phone]; Fax [phone]  ATTENTION: Please call with any questions or concerns to [phone]  and carefully listen to the prompts so that you are directed to the right person  Send all requests for admission clinical reviews, approved or denied determinations and any other requests to fax [phone]   All voicemails are confidential

## 2019-01-23 ENCOUNTER — APPOINTMENT (EMERGENCY)
Dept: RADIOLOGY | Facility: HOSPITAL | Age: 84
End: 2019-01-23
Payer: COMMERCIAL

## 2019-01-23 ENCOUNTER — HOSPITAL ENCOUNTER (OUTPATIENT)
Facility: HOSPITAL | Age: 84
Setting detail: OBSERVATION
Discharge: NURSING, REHAB OR LONG-TERM CARE | End: 2019-01-24
Attending: EMERGENCY MEDICINE | Admitting: HOSPITALIST
Payer: COMMERCIAL

## 2019-01-23 DIAGNOSIS — F03.90 SENILE DEMENTIA WITHOUT BEHAVIORAL DISTURBANCE (HCC): ICD-10-CM

## 2019-01-23 DIAGNOSIS — R26.2 AMBULATORY DYSFUNCTION: ICD-10-CM

## 2019-01-23 DIAGNOSIS — M25.561 CHRONIC PAIN OF RIGHT KNEE: ICD-10-CM

## 2019-01-23 DIAGNOSIS — G89.29 CHRONIC PAIN OF RIGHT KNEE: ICD-10-CM

## 2019-01-23 DIAGNOSIS — M17.11 OSTEOARTHRITIS OF RIGHT KNEE: Primary | ICD-10-CM

## 2019-01-23 LAB
ANION GAP SERPL CALCULATED.3IONS-SCNC: 6 MMOL/L (ref 4–13)
BASOPHILS # BLD AUTO: 0.04 THOUSANDS/ΜL (ref 0–0.1)
BASOPHILS NFR BLD AUTO: 0 % (ref 0–1)
BUN SERPL-MCNC: 20 MG/DL (ref 5–25)
CALCIUM SERPL-MCNC: 8.7 MG/DL (ref 8.3–10.1)
CHLORIDE SERPL-SCNC: 97 MMOL/L (ref 100–108)
CO2 SERPL-SCNC: 33 MMOL/L (ref 21–32)
CREAT SERPL-MCNC: 1.3 MG/DL (ref 0.6–1.3)
EOSINOPHIL # BLD AUTO: 0.05 THOUSAND/ΜL (ref 0–0.61)
EOSINOPHIL NFR BLD AUTO: 1 % (ref 0–6)
ERYTHROCYTE [DISTWIDTH] IN BLOOD BY AUTOMATED COUNT: 13.2 % (ref 11.6–15.1)
GFR SERPL CREATININE-BSD FRML MDRD: 50 ML/MIN/1.73SQ M
GLUCOSE P FAST SERPL-MCNC: 122 MG/DL (ref 65–99)
GLUCOSE SERPL-MCNC: 122 MG/DL (ref 65–140)
HCT VFR BLD AUTO: 31 % (ref 36.5–49.3)
HGB BLD-MCNC: 10.1 G/DL (ref 12–17)
IMM GRANULOCYTES # BLD AUTO: 0.07 THOUSAND/UL (ref 0–0.2)
IMM GRANULOCYTES NFR BLD AUTO: 1 % (ref 0–2)
LYMPHOCYTES # BLD AUTO: 0.86 THOUSANDS/ΜL (ref 0.6–4.47)
LYMPHOCYTES NFR BLD AUTO: 9 % (ref 14–44)
MCH RBC QN AUTO: 30.9 PG (ref 26.8–34.3)
MCHC RBC AUTO-ENTMCNC: 32.6 G/DL (ref 31.4–37.4)
MCV RBC AUTO: 95 FL (ref 82–98)
MONOCYTES # BLD AUTO: 1.18 THOUSAND/ΜL (ref 0.17–1.22)
MONOCYTES NFR BLD AUTO: 12 % (ref 4–12)
NEUTROPHILS # BLD AUTO: 7.54 THOUSANDS/ΜL (ref 1.85–7.62)
NEUTS SEG NFR BLD AUTO: 77 % (ref 43–75)
NRBC BLD AUTO-RTO: 0 /100 WBCS
PLATELET # BLD AUTO: 145 THOUSANDS/UL (ref 149–390)
PMV BLD AUTO: 9.1 FL (ref 8.9–12.7)
POTASSIUM SERPL-SCNC: 4 MMOL/L (ref 3.5–5.3)
RBC # BLD AUTO: 3.27 MILLION/UL (ref 3.88–5.62)
SODIUM SERPL-SCNC: 136 MMOL/L (ref 136–145)
URATE SERPL-MCNC: 6 MG/DL (ref 4.2–8)
WBC # BLD AUTO: 9.74 THOUSAND/UL (ref 4.31–10.16)

## 2019-01-23 PROCEDURE — 80048 BASIC METABOLIC PNL TOTAL CA: CPT | Performed by: NURSE PRACTITIONER

## 2019-01-23 PROCEDURE — 85025 COMPLETE CBC W/AUTO DIFF WBC: CPT | Performed by: NURSE PRACTITIONER

## 2019-01-23 PROCEDURE — G8978 MOBILITY CURRENT STATUS: HCPCS

## 2019-01-23 PROCEDURE — G8980 MOBILITY D/C STATUS: HCPCS

## 2019-01-23 PROCEDURE — 36415 COLL VENOUS BLD VENIPUNCTURE: CPT | Performed by: NURSE PRACTITIONER

## 2019-01-23 PROCEDURE — 84550 ASSAY OF BLOOD/URIC ACID: CPT | Performed by: NURSE PRACTITIONER

## 2019-01-23 PROCEDURE — G8979 MOBILITY GOAL STATUS: HCPCS

## 2019-01-23 PROCEDURE — 99222 1ST HOSP IP/OBS MODERATE 55: CPT | Performed by: NURSE PRACTITIONER

## 2019-01-23 PROCEDURE — 99285 EMERGENCY DEPT VISIT HI MDM: CPT

## 2019-01-23 PROCEDURE — 97163 PT EVAL HIGH COMPLEX 45 MIN: CPT

## 2019-01-23 PROCEDURE — 73564 X-RAY EXAM KNEE 4 OR MORE: CPT

## 2019-01-23 PROCEDURE — 96372 THER/PROPH/DIAG INJ SC/IM: CPT

## 2019-01-23 RX ORDER — DIGOXIN 125 MCG
125 TABLET ORAL DAILY
Status: DISCONTINUED | OUTPATIENT
Start: 2019-01-24 | End: 2019-01-24 | Stop reason: HOSPADM

## 2019-01-23 RX ORDER — TRAMADOL HYDROCHLORIDE 50 MG/1
50 TABLET ORAL 4 TIMES DAILY
Status: DISCONTINUED | OUTPATIENT
Start: 2019-01-23 | End: 2019-01-24 | Stop reason: HOSPADM

## 2019-01-23 RX ORDER — HEPARIN SODIUM 5000 [USP'U]/ML
5000 INJECTION, SOLUTION INTRAVENOUS; SUBCUTANEOUS EVERY 8 HOURS SCHEDULED
Status: DISCONTINUED | OUTPATIENT
Start: 2019-01-23 | End: 2019-01-24 | Stop reason: HOSPADM

## 2019-01-23 RX ORDER — PANTOPRAZOLE SODIUM 20 MG/1
20 TABLET, DELAYED RELEASE ORAL
Status: DISCONTINUED | OUTPATIENT
Start: 2019-01-24 | End: 2019-01-24 | Stop reason: HOSPADM

## 2019-01-23 RX ORDER — MONTELUKAST SODIUM 10 MG/1
10 TABLET ORAL
Status: DISCONTINUED | OUTPATIENT
Start: 2019-01-23 | End: 2019-01-24 | Stop reason: HOSPADM

## 2019-01-23 RX ORDER — ASPIRIN 325 MG
325 TABLET, DELAYED RELEASE (ENTERIC COATED) ORAL DAILY
Status: DISCONTINUED | OUTPATIENT
Start: 2019-01-24 | End: 2019-01-24 | Stop reason: HOSPADM

## 2019-01-23 RX ORDER — ATORVASTATIN CALCIUM 40 MG/1
40 TABLET, FILM COATED ORAL
Status: DISCONTINUED | OUTPATIENT
Start: 2019-01-24 | End: 2019-01-24 | Stop reason: HOSPADM

## 2019-01-23 RX ORDER — CLOPIDOGREL BISULFATE 75 MG/1
75 TABLET ORAL DAILY
Status: DISCONTINUED | OUTPATIENT
Start: 2019-01-24 | End: 2019-01-24 | Stop reason: HOSPADM

## 2019-01-23 RX ORDER — FAMOTIDINE 20 MG/1
40 TABLET, FILM COATED ORAL DAILY
Status: DISCONTINUED | OUTPATIENT
Start: 2019-01-24 | End: 2019-01-24 | Stop reason: HOSPADM

## 2019-01-23 RX ORDER — KETOROLAC TROMETHAMINE 30 MG/ML
15 INJECTION, SOLUTION INTRAMUSCULAR; INTRAVENOUS ONCE
Status: COMPLETED | OUTPATIENT
Start: 2019-01-23 | End: 2019-01-23

## 2019-01-23 RX ORDER — ACETAMINOPHEN 325 MG/1
650 TABLET ORAL ONCE
Status: COMPLETED | OUTPATIENT
Start: 2019-01-23 | End: 2019-01-23

## 2019-01-23 RX ORDER — FUROSEMIDE 40 MG/1
40 TABLET ORAL DAILY
Status: DISCONTINUED | OUTPATIENT
Start: 2019-01-24 | End: 2019-01-24 | Stop reason: HOSPADM

## 2019-01-23 RX ORDER — METOPROLOL TARTRATE 50 MG/1
50 TABLET, FILM COATED ORAL DAILY
Status: DISCONTINUED | OUTPATIENT
Start: 2019-01-24 | End: 2019-01-24 | Stop reason: HOSPADM

## 2019-01-23 RX ADMIN — ACETAMINOPHEN 650 MG: 325 TABLET, FILM COATED ORAL at 16:16

## 2019-01-23 RX ADMIN — MONTELUKAST SODIUM 10 MG: 10 TABLET, FILM COATED ORAL at 22:49

## 2019-01-23 RX ADMIN — TRAMADOL HYDROCHLORIDE 50 MG: 50 TABLET, COATED ORAL at 22:48

## 2019-01-23 RX ADMIN — KETOROLAC TROMETHAMINE 15 MG: 30 INJECTION, SOLUTION INTRAMUSCULAR; INTRAVENOUS at 16:16

## 2019-01-23 RX ADMIN — HEPARIN SODIUM 5000 UNITS: 5000 INJECTION INTRAVENOUS; SUBCUTANEOUS at 22:49

## 2019-01-23 NOTE — ED PROVIDER NOTES
History  Chief Complaint   Patient presents with    Leg Pain     To ED via EMS for continued right leg pain and inability to ambulate  Pt was discharged to home from [place] [address] yesterday for same  27-year-old male with history of atrial fibrillation, aortic stenosis, congestive heart failure, pulmonary hypertension, chronic kidney disease, and gouty arthritis presents emergency department for evaluation acute on chronic right knee pain and difficulty ambulating  Patient was recently admitted to [facility] for skilled rehab, was reportedly discharged back to his residence yesterday  Patient resides with his daughter and son-in-law  I was able to speak with the [facility], who states that over the past 24 hours patient has not been able to ambulate under his own power, has required assistance to transfer out of bed  Patient reports increased pain as of this morning  Denies associated fever, chills, sweats or vomiting            Prior to Admission Medications   Prescriptions Last Dose Informant Patient Reported? Taking?    Ergocalciferol (VITAMIN D2 PO)   Yes No   Sig: Take 50,000 Units by mouth Every 3rd week   aspirin (ECOTRIN) 325 mg EC tablet   Yes No   Sig: Take 325 mg by mouth daily   atorvastatin (LIPITOR) 40 mg tablet   Yes No   Sig: Take 40 mg by mouth daily   clopidogrel (PLAVIX) 75 mg tablet   Yes No   Sig: Take by mouth   digoxin (DIGITEK) 0 125 mg tablet   Yes No   Sig: Take by mouth daily     famotidine (PEPCID) 40 MG tablet   Yes No   Sig: Take 40 mg by mouth daily   furosemide (LASIX) 40 mg tablet   Yes No   Sig: Take by mouth daily     metoprolol tartrate (LOPRESSOR) 50 mg tablet   Yes No   Sig: Take by mouth daily     montelukast (SINGULAIR) 10 mg tablet   Yes No   Sig: Take 10 mg by mouth daily at bedtime   nitroglycerin (NITROSTAT) 0 4 mg SL tablet   Yes No   Sig: Place under the tongue   omeprazole (PriLOSEC) 10 mg delayed release capsule   Yes No   Sig: Take 40 mg by mouth daily     traMADol (ULTRAM) 50 mg tablet   Yes No   Sig: Take 50 mg by mouth 4 (four) times a day      Facility-Administered Medications: None       Past Medical History:   Diagnosis Date    Aortic stenosis     Atrial fibrillation (HCC)     Cardiac disease     CHF (congestive heart failure) (HCC)     Hyperlipidemia     Hypertension     Kyphosis     Osteoarthritis     Prostate CA ([facility] [address] )     Pulmonary hypertension ([facility] )     Renal disorder     Vitamin B12 deficiency        Past Surgical History:   Procedure Laterality Date    JOINT REPLACEMENT      PILONIDAL CYST / SINUS EXCISION      VARICOSE VEIN SURGERY         History reviewed  No pertinent family history  I have reviewed and agree with the history as documented  Social History   Substance Use Topics    Smoking status: Former Smoker    Smokeless tobacco: Never Used    Alcohol use No        Review of Systems   Constitutional: Negative for fever  Musculoskeletal: Positive for arthralgias (R knee) and gait problem  Negative for joint swelling and myalgias  Skin: Negative for color change and wound  Allergic/Immunologic: Negative for immunocompromised state  Neurological: Negative for weakness and numbness  Physical Exam  Physical Exam   Constitutional: He is oriented to person, place, and time  He appears well-developed and well-nourished  No distress  HENT:   Head: Normocephalic and atraumatic  Eyes: Pupils are equal, round, and reactive to light  No scleral icterus  Neck: No JVD present  Cardiovascular: Normal rate and regular rhythm  Exam reveals no gallop and no friction rub  No murmur heard  Pulses:       Radial pulses are 2+ on the right side, and 2+ on the left side  Pulmonary/Chest: No respiratory distress  He has no wheezes  He has no rales  Musculoskeletal:        Right hip: He exhibits normal range of motion          Right knee: He exhibits decreased range of motion (unable to extend beyond 30 deg) and swelling  He exhibits no effusion, no ecchymosis, no deformity and no erythema  No tenderness found  Right ankle: He exhibits normal range of motion  Neurological: He is alert and oriented to person, place, and time  Skin: Skin is warm and dry  He is not diaphoretic  Vitals reviewed  Vital Signs  ED Triage Vitals [01/23/19 1437]   Temperature Pulse Respirations Blood Pressure SpO2   98 1 °F (36 7 °C) 99 20 120/57 98 %      Temp Source Heart Rate Source Patient Position - Orthostatic VS BP Location FiO2 (%)   Tympanic Monitor Sitting Right arm --      Pain Score       5           Vitals:    01/23/19 1615 01/23/19 1630 01/23/19 1645 01/23/19 1700   BP:  118/58  93/50   Pulse: 90 87 92 99   Patient Position - Orthostatic VS:           Visual Acuity      ED Medications  Medications   ketorolac (TORADOL) injection 15 mg (15 mg Intramuscular Given 1/23/19 1616)   acetaminophen (TYLENOL) tablet 650 mg (650 mg Oral Given 1/23/19 1616)       Diagnostic Studies  Results Reviewed     None                 XR knee 4+ vw right injury   ED Interpretation by Oneil Ashby PA-C (01/23 1558)   Chronic arthritic changes without acute process      Final Result by Piero Jiang MD (01/23 1613)      No acute osseous abnormality  Tricompartmental osteoarthritis  Workstation performed: [id number]                    Procedures  Procedures       Phone Contacts  ED Phone Contact    ED Course  ED Course as of Jan 23 1725 Wed Jan 23, 2019   1503 Spoke with pt's son in law, Ya Jones, who states that pt has been unable to self ambulate since discharge from [facility]  Pt has been reporting increasing r knee pain and has required assistance to get out of bed or off the couch       1545 PT at bedside    1648 Messaged Dr Michael Adam for admission                                MDM  Number of Diagnoses or Management Options  Ambulatory dysfunction:   Chronic pain of right knee:   Osteoarthritis of right knee:   Senile dementia without behavioral disturbance:   Diagnosis management comments: A 3 year male presents with ongoing gait instability secondary to chronic arthritis  He is unable to ambulate  PT evaluated the patient at bedside and agree that he cannot safely be discharged home  Will attempt to place patient to skilled nursing facility, will be admitted to observation while placement ongoing  Amount and/or Complexity of Data Reviewed  Tests in the radiology section of CPT®: ordered and reviewed  Review and summarize past medical records: yes  Discuss the patient with other providers: yes  Independent visualization of images, tracings, or specimens: yes      CritCare Time    Disposition  Final diagnoses:   Osteoarthritis of right knee   Chronic pain of right knee   Senile dementia without behavioral disturbance   Ambulatory dysfunction     Time reflects when diagnosis was documented in both MDM as applicable and the Disposition within this note     Time User Action Codes Description Comment    1/23/2019  4:48 PM [place] Page Add [M17 11] Osteoarthritis of right knee     1/23/2019  4:48 PM [place] Page Add [K47 889,  G89 29] Chronic pain of right knee     1/23/2019  4:48 PM [place] Page Add [F03 90] Senile dementia without behavioral disturbance     1/23/2019  4:48 PM [place] Page Add [R26 2] Ambulatory dysfunction       ED Disposition     ED Disposition Condition Comment    Admit  Case was discussed with Dr Alexis Aldridge and the patient's admission status was agreed to be Admission Status: observation status to the service of Dr Reema Varela   Follow-up Information    None         Patient's Medications   Discharge Prescriptions    No medications on file     No discharge procedures on file      ED Provider  Electronically Signed by           Kofi Houser PA-C  01/23/19 3468

## 2019-01-23 NOTE — SOCIAL WORK
Received consult from ED that patient was discharged from [place] [address] on Tuesday and patient's family can no longer take care of him  Was informed by Alexys Singleton RN that family had left the ER  Placed call to Kelly Guidry, patient's daughter at [phone] and there was no answer  Placed call to admissions at [place] [address] to see if patient can return there  Wait call back

## 2019-01-23 NOTE — SOCIAL WORK
Spoke with patient's daughter Elis Zurita and she confirmed patient was discharged from [facility] at 4:30 Tuesday and had much difficulty walking at home  She wants him to return to [facility]  Referral sent to [facility] and they will accept patient once Sunny Navarrete is obtained from [facility]  As per Kym Bhatt of PT, patient is assist of 2 and needs continued rehab  Placed call to [facility] at [phone] to obtain pending auth and fax PT note for review

## 2019-01-23 NOTE — SOCIAL WORK
Continuing to follow patient, obtained pending auth # of [id number] from  [address] [place] [address] at [phone] and faxed PT notes for SNF rehab days to [phone]  Wait Call back  Notified Desiree Fair of [place] [address] of request  Madelyn Martinez, patient's son in lae of above and he is agreeable to SNF placement at [place] [address]

## 2019-01-23 NOTE — PHYSICAL THERAPY NOTE
PT eval   01/23/19 1605   Note Type   Note type Eval only   Pain Assessment   Pain Assessment 0-10   Pain Score 8   Pain Type Acute pain   Pain Location Knee   Pain Orientation Right   Home Living   Type of Home House   Additional Comments just left rehab 1 day ago   Prior Function   Level of [place] Needs assistance with ADLs and functional mobility; Needs assistance with IADLs   Lives With Family   Receives Help From Family   ADL Assistance Needs assistance   IADLs Needs assistance   Falls in the last 6 months 1 to 4   Vocational Retired   Comments unable to arise from couch at home   Restrictions/Precautions   Weight Bearing Precautions Per Order No   General   Additional Pertinent History gout, arthritis   Family/Caregiver Present No   Cognition   Orientation Level Oriented to person;Oriented to place   Memory Decreased recall of precautions;Decreased recall of recent events;Decreased short term memory   Following Commands Follows one step commands with increased time or repetition   Comments some slow processing   RUE Assessment   RUE Assessment WFL   LUE Assessment   LUE Assessment WFL   RLE Assessment   RLE Assessment (PROm knee flexion to 90 hip to 90 ankle wfl strength 2/5)   LLE Assessment   LLE Assessment WFL   Coordination   Movements are Fluid and Coordinated 0   Coordination and Movement Description R le bradykinetic   Proprioception   RLE Proprioception Grossly intact   LLE Proprioception Grossly Intact   Bed Mobility   Rolling R 4  Minimal assistance   Additional items Assist x 1   Rolling L 4  Minimal assistance   Additional items Assist x 1   Supine to Sit 4  Minimal assistance   Additional items Assist x 1   Sit to Supine 3  Moderate assistance   Additional items Assist x 1   Transfers   Sit to Stand 3  Moderate assistance   Additional items Assist x 2   Stand to Sit 4  Minimal assistance   Additional items Assist x 1   Stand pivot 3  Moderate assistance   Additional items Assist x 2  (rw limited wb on Rle flexed posture)   Ambulation/Elevation   Gait pattern (unable)   Balance   Static Sitting Fair   Dynamic Sitting Fair -   Static Standing Poor   Dynamic Standing Poor   Endurance Deficit   Endurance Deficit Yes   Endurance Deficit Description james standing only 30 sec   Activity Tolerance   Activity Tolerance Patient limited by fatigue;Patient limited by pain   Medical Staff Made Aware yes   Nurse Made Aware yes   Assessment   Prognosis Good   Problem List Decreased strength;Decreased range of motion;Decreased endurance; Impaired balance;Decreased mobility;Pain;Decreased coordination   Assessment Pt presents with R knee pain, por mobility, transfer, and essentially non ambulatory   Can benefit from skilled PT services to regain safe ind functional mobility with R knee pain control   Recommend return to in-pt rehab for further PT adn family training  Goals listed below  Goals   Patient Goals get better  move better   Short Term Goal #1 1) safe transfers with rw wbat Rle stand-by assist of 1 2) improve strength R le 1/2 grade 3) imprve standing balance to fair 4) safe amb with rw 50' level no sob stand-by assist of 1  Plan   Treatment/Interventions ADL retraining;Functional transfer training;LE strengthening/ROM; Therapeutic exercise; Endurance training;Patient/family training;Equipment eval/education; Bed mobility;Gait training;Spoke to nursing;Spoke to MD;Spoke to case management   PT Frequency 5x/wk   Recommendation   Recommendation Short-term skilled PT   Equipment Recommended Walker   PT - OK to Discharge (to STR with medical clearance)   Barthel Index   Feeding 10   Bathing 0   Grooming Score 0   Dressing Score 5   Bladder Score 10   Bowels Score 10   Toilet Use Score 5   Transfers (Bed/Chair) Score 5   Mobility (Level Surface) Score 0   Stairs Score 0   Barthel Index Score 45   Tracy Alvares, PT

## 2019-01-24 VITALS
TEMPERATURE: 98.1 F | HEART RATE: 76 BPM | RESPIRATION RATE: 18 BRPM | HEIGHT: 70 IN | OXYGEN SATURATION: 95 % | BODY MASS INDEX: 26.48 KG/M2 | SYSTOLIC BLOOD PRESSURE: 99 MMHG | WEIGHT: 185 LBS | DIASTOLIC BLOOD PRESSURE: 57 MMHG

## 2019-01-24 PROCEDURE — 99217 PR OBSERVATION CARE DISCHARGE MANAGEMENT: CPT | Performed by: HOSPITALIST

## 2019-01-24 RX ADMIN — FUROSEMIDE 40 MG: 40 TABLET ORAL at 09:04

## 2019-01-24 RX ADMIN — HEPARIN SODIUM 5000 UNITS: 5000 INJECTION INTRAVENOUS; SUBCUTANEOUS at 05:58

## 2019-01-24 RX ADMIN — TRAMADOL HYDROCHLORIDE 50 MG: 50 TABLET, COATED ORAL at 09:05

## 2019-01-24 RX ADMIN — CLOPIDOGREL BISULFATE 75 MG: 75 TABLET ORAL at 09:04

## 2019-01-24 RX ADMIN — TRAMADOL HYDROCHLORIDE 50 MG: 50 TABLET, COATED ORAL at 11:56

## 2019-01-24 RX ADMIN — FAMOTIDINE 40 MG: 20 TABLET ORAL at 09:04

## 2019-01-24 RX ADMIN — DIGOXIN 125 MCG: 125 TABLET ORAL at 09:04

## 2019-01-24 RX ADMIN — ASPIRIN 325 MG: 325 TABLET, DELAYED RELEASE ORAL at 09:04

## 2019-01-24 RX ADMIN — PANTOPRAZOLE SODIUM 20 MG: 20 TABLET, DELAYED RELEASE ORAL at 05:58

## 2019-01-24 NOTE — ASSESSMENT & PLAN NOTE
-  Patient reports limited mobility of right knee with pain  -  Right lateral knee mildly warm with erythema, uric acid pending  -  Consider starting colchicine and antiinflammatories  -  PT/OT consult

## 2019-01-24 NOTE — SOCIAL WORK
Called and spoke with Christian Deras of [facility] at [phone]  Patient has been approved for admission to [facility]  Auth # [id number]  Called and notified Carmel Trevino at [facility]  [facility]TS to transport patient via w/c van at 2110  Called and notified patients daughter Javi Good and Carmel Trevino at [facility]

## 2019-01-24 NOTE — DISCHARGE SUMMARY
Discharge- Yue Pierre 1934, 80 y o  male MRN: [id number]    Unit/Bed#: [address] [place] [address] Encounter: [id number]    Primary Care Provider: Johnny Escamilla MD   Date and time admitted to hospital: 1/23/2019  2:31 PM        Aortic stenosis, severe   Assessment & Plan    -follow up with outpatient cardiology  -patient does not appear volume overloaded     Chronic atrial fibrillation ([address] )   Assessment & Plan    -  Continue ASA and plavix  -  Rate controlled with lopressor  -  Continue Digoxin     * Ambulatory dysfunction   Assessment & Plan    -patient to return to [place] [address] Physician / Practitioner: Blue Rain MD  PCP: Johnny Escamilla MD  Admission Date:   Admission Orders     Ordered        01/23/19 1720  Place in Observation (expected length of stay for this patient is less than two midnights)  Once             Discharge Date: 01/24/19    Resolved Problems  Date Reviewed: 1/24/2019    None          Consultations During Hospital Stay:  · None    Procedures Performed:   · None    Significant Findings / Test Results:   · None    Incidental Findings:   · None     Test Results Pending at Discharge (will require follow up): · None     Outpatient Tests Requested:  · None    Complications:  None    Reason for Admission:  Ambulatory dysfunction    Hospital Course:     Yue Pierre is a 80 y o  male patient who originally presented to the hospital on 1/23/2019 due to ambulatory dysfunction as outlined in the H&P done on admission  The patient was to be sent from the ER directly to [facility] but the insurance authorization could not be obtained before the close of business on 1/23/2019  Please see above list of diagnoses and related plan for additional information  Condition at Discharge: good     Discharge Day Visit / Exam:     Subjective:  No new complaints    Vitals: Blood Pressure: 99/57 (01/24/19 0758)  Pulse: 76 (01/24/19 0758)  Temperature: 98 1 °F (36 7 °C) (01/24/19 0632)  Temp Source: Oral (01/24/19 0758)  Respirations: 18 (01/24/19 0758)  Height: 5' 10" (177 8 cm) (01/23/19 2200)  Weight - Scale: 83 9 kg (185 lb) (01/23/19 2200)  SpO2: 95 % (01/24/19 0758)  Exam:   Physical Exam  Gen: NAD, awake and alert, well developed, well nourished  Eyes: EOMI, PERRLA, no scleral icterus  ENMT:  Oropharynx clear of erythema or exudates, no nasal discharge, no otic discharge, moist mucous membranes  Neck:  Supple  Lymph:  No anterior or posterior cervical or supraclavicular lymphadenopathy  Cardiovascular:  Normal rate, irregularly irregular rhythm, normal N7-U9, 4/6 systolic ejection murmur  Lungs:  Clear to auscultation bilaterally anteriorly, no wheezes, or rales, or rhonchi  Abdomen:  Positive bowel sounds, soft, nontender, nondistended, no palpable organomegaly   Skin:  Intact, no obvious lesions or rashes  MSK: R knee with suprapatellar effusion, tenderness to palpation in the lateral collateral ligament as well as the patella, no warmth or signs of infection  Neuro: Cranial nerves 2-12 are intact, non-focal, moves all extremities      Discharge instructions/Information to patient and family:   See after visit summary for information provided to patient and family  Provisions for Follow-Up Care:  See after visit summary for information related to follow-up care and any pertinent home health orders  Disposition:     Other [address] to [facility] SNF:   · Not Applicable to this Patient - Not Applicable to this Patient    Planned Readmission: None     Discharge Statement:  I spent 25 minutes discharging the patient  This time was spent on the day of discharge  I had direct contact with the patient on the day of discharge   Greater than 50% of the total time was spent examining patient, answering all patient questions, arranging and discussing plan of care with patient as well as directly providing post-discharge instructions  Additional time then spent on discharge activities  Discharge Medications:  See after visit summary for reconciled discharge medications provided to patient and family        ** Please Note: This note has been constructed using a voice recognition system **

## 2019-01-24 NOTE — PLAN OF CARE
PAIN - ADULT     Verbalizes/displays adequate comfort level or baseline comfort level Adequate for Discharge        Potential for Falls     Patient will remain free of falls Adequate for Discharge        Prexisting or High Potential for Compromised Skin Integrity     Skin integrity is maintained or improved Adequate for Discharge        SKIN/TISSUE INTEGRITY - ADULT     Skin integrity remains intact Adequate for Discharge     Incision(s), wounds(s) or drain site(s) healing without S/S of infection Adequate for Discharge     Oral mucous membranes remain intact Adequate for Discharge

## 2019-01-24 NOTE — H&P
H&P- Janie Butler 1934, 80 y o  male MRN: [id number]    Unit/Bed#: ED 06 Encounter: [id number]    Primary Care Provider: Karlene Leigh MD   Date and time admitted to hospital: 1/23/2019  2:31 PM      Reason for Admission / Chief Complaint:  Ambulatory dysfunction, right knee pain  History of Present Illness:  Janie Butler is a 80 y o  male with a past medical history significant for aortic stenosis, atrial fibrillation, dementia, and gouty arthritis who presents from home with ambulatory dysfunction and right knee pain  Mr Thad Flowers was discharged from [facility] rehab on Tuesday to resume residence with his daughter and son-in-law  Because of his ambulatory dysfunction and need for assist of 2 with ambulation there unable to take care of him at this time  The patient reports that he had been able to ambulate went to bed on Tuesday and woke up with significant discomfort and inability to ambulate  The patient had recently been admitted for gouty arthritis  His right knee is mildly erythematous with warmth and he reports tenderness with decreased range of motion  He will be admitted PT OT consulted and possibility of gouty arthritis will be explored  Uric acid is currently pending  History obtained from chart review and patient  Past Medical History:  Past Medical History:   Diagnosis Date    Aortic stenosis     Atrial fibrillation (HCC)     Cardiac disease     CHF (congestive heart failure) (HCC)     Hyperlipidemia     Hypertension     Kyphosis     Osteoarthritis     Prostate CA ([address] )     Pulmonary hypertension (HCC)     Renal disorder     Vitamin B12 deficiency        Past Surgical History:  Past Surgical History:   Procedure Laterality Date    JOINT REPLACEMENT      PILONIDAL CYST / SINUS EXCISION      VARICOSE VEIN SURGERY         Past Family History:  History reviewed  No pertinent family history      Social History:  History   Smoking Status    Former Smoker   Smokeless Tobacco    Never Used      History   Alcohol Use No     History   Drug Use No     Marital Status: /Civil Union    Medications:  No current facility-administered medications for this encounter  Home medications:  Prior to Admission medications    Medication Sig Start Date End Date Taking? Authorizing Provider   aspirin (ECOTRIN) 325 mg EC tablet Take 325 mg by mouth daily    Historical Provider, MD   atorvastatin (LIPITOR) 40 mg tablet Take 40 mg by mouth daily    Historical Provider, MD   clopidogrel (PLAVIX) 75 mg tablet Take by mouth    Historical Provider, MD   digoxin (DIGITEK) 0 125 mg tablet Take by mouth daily      Historical Provider, MD   Ergocalciferol (VITAMIN D2 PO) Take 50,000 Units by mouth Every 3rd week    Historical Provider, MD   famotidine (PEPCID) 40 MG tablet Take 40 mg by mouth daily    Historical Provider, MD   furosemide (LASIX) 40 mg tablet Take by mouth daily      Historical Provider, MD   metoprolol tartrate (LOPRESSOR) 50 mg tablet Take by mouth daily      Historical Provider, MD   montelukast (SINGULAIR) 10 mg tablet Take 10 mg by mouth daily at bedtime    Historical Provider, MD   nitroglycerin (NITROSTAT) 0 4 mg SL tablet Place under the tongue    Historical Provider, MD   omeprazole (PriLOSEC) 10 mg delayed release capsule Take 40 mg by mouth daily      Historical Provider, MD   traMADol (ULTRAM) 50 mg tablet Take 50 mg by mouth 4 (four) times a day    Historical Provider, MD     Allergies: Allergies   Allergen Reactions    Penicillins        ROS:   Review of Systems   Reason unable to perform ROS: patient is forgetful with short term memory loss  Constitutional: Positive for activity change  Musculoskeletal: Positive for joint swelling  All other systems reviewed and are negative        Vitals:  Vitals:    01/23/19 1830 01/23/19 1845 01/23/19 1900 01/23/19 1915   BP: 96/61 94/53 100/58 103/59   BP Location:    Right arm   Pulse: 84 86 83 84 Resp:    19   Temp:       TempSrc:       SpO2: 95% 95% 96% 95%   Weight:         Temperature:   Temp (24hrs), Av 1 °F (36 7 °C), Min:98 1 °F (36 7 °C), Max:98 1 °F (36 7 °C)    Current Temperature: 98 1 °F (36 7 °C)    Weights:   IBW: -88 kg  Body mass index is 39 11 kg/m²  Non-Invasive/Invasive Ventilation Settings:  Respiratory    Lab Data (Last 4 hours)    None         O2/Vent Data (Last 4 hours)    None               Physical Exam:  Physical Exam   Constitutional: Vital signs are normal  He is cooperative  Eyes: EOM and lids are normal    Cardiovascular: Intact distal pulses  An irregular rhythm present  Occasional extrasystoles are present  Bradycardia present  Murmur heard  Systolic murmur is present with a grade of 5/6   Pulmonary/Chest: Effort normal and breath sounds normal    Abdominal: Soft  Normal appearance and bowel sounds are normal    Musculoskeletal:        Right knee: He exhibits decreased range of motion and erythema  Tenderness found  Lateral joint line tenderness noted  Neurological: He is alert  Disoriented to time, forgetful with short term memory loss  Articulation of speech slightly unclear do to missing dentation   Psychiatric: He has a normal mood and affect  Labs:          No results found for: TROPONINI    Imaging: knee imaging reviewed    Micro:  Blood Culture:   Lab Results   Component Value Date    BLOODCX No Growth After 5 Days  2019    BLOODCX No Growth After 5 Days  2019     Urine Culture: No results found for: URINECX  Sputum Culture: No components found for: SPUTUMCX  Wound Culure: No results found for: WOUNDCULT  ______________________________________________________________________    Assessment:   Active Problems:    Chronic atrial fibrillation (HCC)    Aortic stenosis, severe    Ambulatory dysfunction  Resolved Problems:    * No resolved hospital problems   *      Ambulatory dysfunction   Assessment & Plan    -  Patient reports limited mobility of right knee with pain  -  Right lateral knee mildly warm with erythema, uric acid pending  -  Consider starting colchicine and antiinflammatories  -  PT/OT consult     Aortic stenosis, severe   Assessment & Plan    - +9/1 systolic murmur noted  -       Chronic atrial fibrillation (HCC)   Assessment & Plan    -  Continue ASA and plavix  -  Rate controlled with lopressor  -  Continue Digoxin       ___________________________    VTE Pharmacologic Prophylaxis: Heparin  VTE Mechanical Prophylaxis: sequential compression device    Invasive lines and devices: Invasive Devices     Peripheral Intravenous Line            Peripheral IV 01/23/19 Right Forearm less than 1 day                Code Status: Prior  POA:    POLST:      Given critical illness, patient length of stay will require greater than two midnights      Signature: JODY Lama  Date: 1/23/2019

## 2019-01-24 NOTE — UTILIZATION REVIEW
Initial Clinical Review    Admission: Date/Time/Statement: N/A @ N/A   Orders Placed This Encounter   Procedures    Place in Observation (expected length of stay for this patient is less than two midnights)     Standing Status:   Standing     Number of Occurrences:   1     Order Specific Question:   Admitting Physician     Answer:   Zachariah Toure [[id number]]     Order Specific Question:   Level of Care     Answer:   Med Surg [16]     ED: Date/Time/Mode of Arrival:   ED Arrival Information     Expected Arrival Acuity Means of Arrival Escorted By Service Admission Type    - 1/23/2019 14:30 Less Urgent Ambulance [facility] General Medicine Urgent    Arrival Complaint    knee pain        Chief Complaint:   Chief Complaint   Patient presents with    Leg Pain     To ED via EMS for continued right leg pain and inability to ambulate  Pt was discharged to home from [place] [address] yesterday for same  History of Illness:   79-year-old male with history of atrial fibrillation, aortic stenosis, congestive heart failure, pulmonary hypertension, chronic kidney disease, and gouty arthritis presents emergency department for evaluation acute on chronic right knee pain and difficulty ambulating  Patient was recently admitted to [facility] for skilled rehab, was reportedly discharged back to his residence yesterday  Patient resides with his daughter and son-in-law  I was able to speak with the [facility], who states that over the past 24 hours patient has not been able to ambulate under his own power, has required assistance to transfer out of bed  Patient reports increased pain as of this morning    Denies associated fever, chills, sweats or vomiting  ED Vital Signs:   ED Triage Vitals [01/23/19 1437]   Temperature Pulse Respirations Blood Pressure SpO2   98 1 °F (36 7 °C) 99 20 120/57 98 %      Temp Source Heart Rate Source Patient Position - Orthostatic VS BP Location FiO2 (%)   Tympanic Monitor Sitting Right arm --      Pain Score       5        Wt Readings from Last 1 Encounters:   01/23/19 83 9 kg (185 lb)     Vital Signs (abnormal):   Pertinent Labs/Diagnostic Test Results:   HGB 10 1  CL 97 CO2 33 GLUC 122 GFR 50  R KNEE XRAY=No acute osseous abnormality  Tricompartmental osteoarthritis    ED Treatment:   Medication Administration from 01/23/2019 1430 to 01/23/2019 2202       Date/Time Order Dose Route Action Action by Comments     01/23/2019 1616 ketorolac (TORADOL) injection 15 mg 15 mg Intramuscular Given Michael Alvarez RN      01/23/2019 1616 acetaminophen (TYLENOL) tablet 650 mg 650 mg Oral Given Michael Alvarez RN         Past Medical/Surgical History:    Active Ambulatory Problems     Diagnosis Date Noted    Chronic atrial fibrillation ([facility] ) 01/02/2019    Aortic stenosis, severe 01/02/2019    Senile dementia without behavioral disturbance 01/02/2019    Acute gouty arthritis 01/04/2019     Resolved Ambulatory Problems     Diagnosis Date Noted    MANDI (acute kidney injury) ([facility] ) 01/02/2019    Hypokalemia 01/02/2019     Past Medical History:   Diagnosis Date    Aortic stenosis     Atrial fibrillation (HCC)     Cardiac disease     CHF (congestive heart failure) (HCC)     Hyperlipidemia     Hypertension     Kyphosis     Osteoarthritis     Prostate CA ([facility] )     Pulmonary hypertension ([facility] )     Renal disorder     Vitamin B12 deficiency      Admitting Diagnosis: Knee pain [M25 569]  Osteoarthritis of right knee [M17 11]  Ambulatory dysfunction [R26 2]  Senile dementia without behavioral disturbance [F03 90]  Chronic pain of right knee [M25 561, G89 29]  Age/Sex: 80 y o  male  Assessment/Plan:   Ambulatory dysfunction   Assessment & Plan     -  Patient reports limited mobility of right knee with pain  -  Right lateral knee mildly warm with erythema, uric acid pending  -  Consider starting colchicine and antiinflammatories  -  PT/OT consult     Admission Orders:  MED SURG  PT/OT EVAL & Alexsander Mccord  Scheduled Meds:   Current Facility-Administered Medications:  aspirin 325 mg Oral Daily April D JODY Silva   atorvastatin 40 mg Oral Daily With Dinner April D Anastasiia Murray, [address]   clopidogrel 75 mg Oral Daily April D JODY Silva   digoxin 125 mcg Oral Daily April D JODY Silva   famotidine 40 mg Oral Daily April D JODY Silva   furosemide 40 mg Oral Daily April JODY Adams   heparin (porcine) 5,000 Units Subcutaneous [facility] [address] April D JODY Silva   metoprolol tartrate 50 mg Oral Daily April D JODY Silva   montelukast 10 mg Oral HS April D JODY Silva   pantoprazole 20 mg Oral Early Morning April D JODY Silva   traMADol 50 mg Oral 4x Daily April D JODY Silva     Continuous Infusions:    PRN Meds:

## 2019-01-24 NOTE — PLAN OF CARE
PAIN - ADULT     Verbalizes/displays adequate comfort level or baseline comfort level Progressing        Potential for Falls     Patient will remain free of falls Progressing        Prexisting or High Potential for Compromised Skin Integrity     Skin integrity is maintained or improved Progressing        SKIN/TISSUE INTEGRITY - ADULT     Skin integrity remains intact Progressing     Incision(s), wounds(s) or drain site(s) healing without S/S of infection Progressing     Oral mucous membranes remain intact Progressing

## 2019-04-18 ENCOUNTER — OFFICE VISIT (OUTPATIENT)
Dept: CARDIOLOGY CLINIC | Facility: CLINIC | Age: 84
End: 2019-04-18
Payer: COMMERCIAL

## 2019-04-18 VITALS
SYSTOLIC BLOOD PRESSURE: 122 MMHG | HEIGHT: 70 IN | BODY MASS INDEX: 25.34 KG/M2 | HEART RATE: 92 BPM | WEIGHT: 177 LBS | DIASTOLIC BLOOD PRESSURE: 54 MMHG

## 2019-04-18 DIAGNOSIS — I48.20 CHRONIC ATRIAL FIBRILLATION (HCC): Primary | ICD-10-CM

## 2019-04-18 DIAGNOSIS — R94.31 ABNORMAL EKG: ICD-10-CM

## 2019-04-18 DIAGNOSIS — I35.0 AORTIC STENOSIS, SEVERE: ICD-10-CM

## 2019-04-18 PROCEDURE — 93000 ELECTROCARDIOGRAM COMPLETE: CPT | Performed by: INTERNAL MEDICINE

## 2019-04-18 PROCEDURE — 99203 OFFICE O/P NEW LOW 30 MIN: CPT | Performed by: INTERNAL MEDICINE

## 2019-04-18 RX ORDER — ACETAMINOPHEN 325 MG/1
650 TABLET ORAL EVERY 6 HOURS PRN
COMMUNITY

## 2019-04-18 RX ORDER — UREA 10 %
2 LOTION (ML) TOPICAL EVERY 6 HOURS PRN
COMMUNITY

## 2019-04-18 RX ORDER — BACITRACIN 500 [USP'U]/G
OINTMENT TOPICAL AS NEEDED
COMMUNITY
End: 2019-10-14

## 2019-04-18 RX ORDER — SERTRALINE HYDROCHLORIDE 25 MG/1
75 TABLET, FILM COATED ORAL DAILY
COMMUNITY

## 2019-04-18 RX ORDER — ALLOPURINOL 100 MG/1
200 TABLET ORAL DAILY
COMMUNITY

## 2019-10-14 ENCOUNTER — OFFICE VISIT (OUTPATIENT)
Dept: CARDIOLOGY CLINIC | Facility: CLINIC | Age: 84
End: 2019-10-14
Payer: COMMERCIAL

## 2019-10-14 VITALS
BODY MASS INDEX: 25.4 KG/M2 | OXYGEN SATURATION: 98 % | HEART RATE: 98 BPM | SYSTOLIC BLOOD PRESSURE: 116 MMHG | HEIGHT: 70 IN | DIASTOLIC BLOOD PRESSURE: 64 MMHG

## 2019-10-14 DIAGNOSIS — I48.20 CHRONIC ATRIAL FIBRILLATION (HCC): Primary | ICD-10-CM

## 2019-10-14 DIAGNOSIS — I35.0 AORTIC STENOSIS, SEVERE: ICD-10-CM

## 2019-10-14 PROCEDURE — 99213 OFFICE O/P EST LOW 20 MIN: CPT | Performed by: INTERNAL MEDICINE

## 2019-10-14 NOTE — PROGRESS NOTES
Cardiology Follow Up    Chelle Rivas  1934  [id number]  [facility] [place]  [address]  [phone]  [phone]    1  Chronic atrial fibrillation     2  Aortic stenosis, severe         Interval History:   Cardiology follow-up  Patient remains clinically stable  He is wheelchair-bound  He is on oxygen therapy  Chest x-ray earlier this year revealed mild CHF, he was treated with low-dose diuretic  Presently denies any chest pain or dyspnea  His dementia is somewhat stable with good days and bad days      Patient Active Problem List   Diagnosis    Chronic atrial fibrillation    Aortic stenosis, severe    Senile dementia without behavioral disturbance ([facility])    Acute gouty arthritis    Ambulatory dysfunction    Abnormal EKG     Past Medical History:   Diagnosis Date    Aortic stenosis     Atrial fibrillation ([facility] )     Cardiac disease     CHF (congestive heart failure) ([facility])     Hyperlipidemia     Hypertension     Kyphosis     Osteoarthritis     Prostate CA ([facility] )     Pulmonary hypertension ([place] [address] )     Renal disorder     Vitamin B12 deficiency      Social History     Socioeconomic History    Marital status: /Civil Union     Spouse name: Not on file    Number of children: Not on file    Years of education: Not on file    Highest education level: Not on file   Occupational History    Not on file   Social Needs    Financial resource strain: Not on file    Food insecurity:     Worry: Not on file     Inability: Not on file    Transportation needs:     Medical: Not on file     Non-medical: Not on file   Tobacco Use    Smoking status: Former Smoker    Smokeless tobacco: Never Used   Substance and Sexual Activity    Alcohol use: No    Drug use: No    Sexual activity: Not on file   Lifestyle    Physical activity:     Days per week: Not on file     Minutes per session: Not on file  Stress: Not on file   Relationships    Social connections:     Talks on phone: Not on file     Gets together: Not on file     Attends Scientologist service: Not on file     Active member of club or organization: Not on file     Attends meetings of clubs or organizations: Not on file     Relationship status: Not on file    Intimate partner violence:     Fear of current or ex partner: Not on file     Emotionally abused: Not on file     Physically abused: Not on file     Forced sexual activity: Not on file   Other Topics Concern    Not on file   Social History Narrative    Not on file      No family history on file    Past Surgical History:   Procedure Laterality Date    JOINT REPLACEMENT      PILONIDAL CYST / SINUS EXCISION      VARICOSE VEIN SURGERY         Current Outpatient Medications:     acetaminophen (TYLENOL) 325 mg tablet, Take 650 mg by mouth every 6 (six) hours as needed for mild pain, Disp: , Rfl:     allopurinol (ZYLOPRIM) 100 mg tablet, Take 100 mg by mouth daily, Disp: , Rfl:     aspirin (ECOTRIN) 325 mg EC tablet, Take 325 mg by mouth daily, Disp: , Rfl:     atorvastatin (LIPITOR) 40 mg tablet, Take 40 mg by mouth daily, Disp: , Rfl:     calcium carbonate (OS-ARNULFO) 1250 (500 Ca) MG chewable tablet, Chew 1 tablet daily, Disp: , Rfl:     clopidogrel (PLAVIX) 75 mg tablet, Take by mouth, Disp: , Rfl:     digoxin (DIGITEK) 0 125 mg tablet, Take by mouth daily  , Disp: , Rfl:     Ergocalciferol (VITAMIN D2 PO), Take 50,000 Units by mouth Every 3rd week, Disp: , Rfl:     famotidine (PEPCID) 40 MG tablet, Take 40 mg by mouth daily, Disp: , Rfl:     furosemide (LASIX) 40 mg tablet, Take by mouth daily  , Disp: , Rfl:     metoprolol tartrate (LOPRESSOR) 50 mg tablet, Take by mouth daily  , Disp: , Rfl:     montelukast (SINGULAIR) 10 mg tablet, Take 10 mg by mouth daily at bedtime, Disp: , Rfl:     nitroglycerin (NITROSTAT) 0 4 mg SL tablet, Place under the tongue, Disp: , Rfl:     omeprazole (PriLOSEC) 10 mg delayed release capsule, Take 40 mg by mouth daily  , Disp: , Rfl:     sertraline (ZOLOFT) 25 mg tablet, Take 25 mg by mouth daily, Disp: , Rfl:     traMADol (ULTRAM) 50 mg tablet, Take 50 mg by mouth 4 (four) times a day, Disp: , Rfl:     zinc oxide 20 % ointment, Apply topically as needed, Disp: , Rfl:   Allergies   Allergen Reactions    Penicillins        Labs:  No visits with results within 6 Month(s) from this visit  Latest known visit with results is:   Admission on 01/23/2019, Discharged on 01/24/2019   Component Date Value    WBC 01/23/2019 9 74     RBC 01/23/2019 3 27*    Hemoglobin 01/23/2019 10 1*    Hematocrit 01/23/2019 31 0*    MCV 01/23/2019 95     MCH 01/23/2019 30 9     MCHC 01/23/2019 32 6     RDW 01/23/2019 13 2     MPV 01/23/2019 9 1     Platelets 42/67/4566 145*    nRBC 01/23/2019 0     Neutrophils Relative 01/23/2019 77*    Immat GRANS % 01/23/2019 1     Lymphocytes Relative 01/23/2019 9*    Monocytes Relative 01/23/2019 12     Eosinophils Relative 01/23/2019 1     Basophils Relative 01/23/2019 0     Neutrophils Absolute 01/23/2019 7 54     Immature Grans Absolute 01/23/2019 0 07     Lymphocytes Absolute 01/23/2019 0 86     Monocytes Absolute 01/23/2019 1 18     Eosinophils Absolute 01/23/2019 0 05     Basophils Absolute 01/23/2019 0 04     Sodium 01/23/2019 136     Potassium 01/23/2019 4 0     Chloride 01/23/2019 97*    CO2 01/23/2019 33*    ANION GAP 01/23/2019 6     BUN 01/23/2019 20     Creatinine 01/23/2019 1 30     Glucose 01/23/2019 122     Glucose, Fasting 01/23/2019 122*    Calcium 01/23/2019 8 7     eGFR 01/23/2019 50     Uric Acid 01/23/2019 6 0      Imaging: No results found  Review of Systems:  Review of Systems   Respiratory: Negative for shortness of breath, wheezing and stridor  Cardiovascular: Negative for chest pain and palpitations  Neurological: Negative for facial asymmetry     Psychiatric/Behavioral: Positive for behavioral problems and decreased concentration  Negative for sleep disturbance  Physical Exam:  Physical Exam   Neck: No JVD present  Cardiovascular: Normal rate  Exam reveals no gallop and no friction rub  Murmur (3/4 systolic ejection murmur currently peaking, loudest at the base, audible throughout, radiating to the neck) heard  Irregular   Pulmonary/Chest: Effort normal and breath sounds normal  No stridor  No respiratory distress  He has no wheezes  He has no rales  Musculoskeletal: He exhibits no edema  Discussion/Summary:  Chronic atrial fibrillation, mostly wheelchair-bound, ambulatory dysfunction and high risk of falling, not on anticoagulation because of that, continue dual antiplatelet therapy  Valvular heart disease with aortic stenosis, severe, echocardiogram last revealed a mean gradient of 50 mm of mercury left systolic function remains normal with inferior hypokinesis concomitant mild-to-moderate mitral and tricuspid insufficiency  Aortic root is dilated at 47 mm  Again he is not a candidate for any interventions, given his dementia  Will continue current medications, consideration for palliative care evaluation   Patient has previously expressed no desire for aggressive interventions  Family is in agreement with that    This note was completed in part utilizing [employer] direct voice recognition software  Grammatical errors, random word insertion, spelling mistakes, and incomplete sentences may be an occasional consequence of the system secondary to software limitations, ambient noise and hardware issues  At the time of dictation, efforts were made to edit, clarify and /or correct errors  Please read the chart carefully and recognize, using context, where substitutions have occurred    If you have any questions or concerns about the context, text or information contained within the body of this dictation, please contact myself, the provider, for further clarification

## 2020-10-01 NOTE — ED NOTES
2 RN Skin Check    2 RN skin check completed with JOSSELYN Hall.   Devices in place: NA.  Skin assessed under devices: N\A.  Confirmed pressure ulcers found on: N/A.  New potential pressure ulcers noted on N/A. Wound consult placed No.  The following interventions in place Pillows.   Returned from [facility]  Family at bedside       Mateus Rajan RN  01/02/19 0924